# Patient Record
Sex: MALE | Race: ASIAN | NOT HISPANIC OR LATINO | Employment: FULL TIME | URBAN - METROPOLITAN AREA
[De-identification: names, ages, dates, MRNs, and addresses within clinical notes are randomized per-mention and may not be internally consistent; named-entity substitution may affect disease eponyms.]

---

## 2017-04-28 ENCOUNTER — OFFICE VISIT (OUTPATIENT)
Dept: FAMILY MEDICINE | Facility: CLINIC | Age: 47
End: 2017-04-28

## 2017-04-28 VITALS
RESPIRATION RATE: 16 BRPM | TEMPERATURE: 97.9 F | OXYGEN SATURATION: 97 % | HEART RATE: 76 BPM | SYSTOLIC BLOOD PRESSURE: 160 MMHG | WEIGHT: 152.6 LBS | DIASTOLIC BLOOD PRESSURE: 88 MMHG | HEIGHT: 63 IN | BODY MASS INDEX: 27.04 KG/M2

## 2017-04-28 DIAGNOSIS — R80.9 PROTEINURIA: Primary | ICD-10-CM

## 2017-04-28 DIAGNOSIS — R03.0 ELEVATED BLOOD PRESSURE READING WITHOUT DIAGNOSIS OF HYPERTENSION: ICD-10-CM

## 2017-04-28 DIAGNOSIS — K21.9 GASTROESOPHAGEAL REFLUX DISEASE, ESOPHAGITIS PRESENCE NOT SPECIFIED: ICD-10-CM

## 2017-04-28 LAB
BILIRUBIN UR: NEGATIVE
BLOOD UR: ABNORMAL
CLARITY, URINE: CLEAR
COLOR UR: YELLOW
GLUCOSE URINE: NEGATIVE
KETONES UR QL: NEGATIVE
LEUKOCYTE ESTERASE UR: NEGATIVE
NITRITE UR QL STRIP: NEGATIVE
PH UR STRIP: 6 [PH] (ref 5–7)
PROTEIN UR: ABNORMAL
SP GR UR STRIP: >=1.03
UROBILINOGEN UR STRIP-ACNC: ABNORMAL

## 2017-04-28 RX ORDER — AMLODIPINE BESYLATE 5 MG/1
5 TABLET ORAL DAILY
Qty: 90 TABLET | Refills: 1 | Status: SHIPPED | OUTPATIENT
Start: 2017-04-28 | End: 2020-02-20

## 2017-04-28 NOTE — PATIENT INSTRUCTIONS
Thank you for coming in today Mr. Llanos!  Start taking amlodipine 5mg daily for blood pressure.   You can take ranitidine for heartburn/stomach pain.   Please follow up in 2 weeks for blood pressure check and to discuss stomach pain.       Proteinuria (Protein in the Urine)  The kidney filters waste products and unwanted substances from the blood. These waste products end up in the urine. Protein is an important part of the blood and is not filtered out. Normally, there is no protein in the urine.  Proteinuria occurs when some of the normal protein in the bloodstream ends up in the urine. Protein in the urine will show up on a standard urine test.   Protein in the urine can be a sign of serious disease or a harmless temporary condition. For example, heavy exercise or fever can cause temporary proteinuria. This is normal and will go away if the urine test is repeated.  If urine tests continue to show protein in the urine, chronic kidney disease may be present. Common causes of kidney disease include diabetes, hypertension, and conditions that cause inflammation in the kidneys.  Protein in the blood helps keep fluids from leaking out of the blood vessels and into the surrounding tissues. Mild or temporary proteinuria doesn't cause any symptoms. However, if too much protein is lost from the body, there may be swelling as fluid leaks from the blood vessels. Swelling may appear in legs, feet, and ankles or elsewhere such as lower back, face and eyelids.  If proteinuria persists on repeat urine testing, more tests will be needed to figure out the cause. If the cause is still unclear, you may be told to see a kidney specialist.  Home care  No special home care is needed until the cause of your proteinuria is known.  Follow-up care  Follow up with your doctor or as advised by our staff.  When to seek medical care  Get prompt medical attention if any of the following occur:    Nausea or vomiting    Severe weakness, dizziness,  fainting, drowsiness, or confusion    Chest pain or shortness of breath    Unexpected weight gain or swelling in the legs, ankles, or around the eyes    Dark colored urine    Decreased or absent urine output    8739-4427 [employer]. [address] [place] [address], [place] [address]. All rights reserved. This information is not intended as a substitute for professional medical care. Always follow your healthcare professional's instructions.        GERD (Adult)    The esophagus is a tube that carries food from the mouth to the stomach. A valve at the lower end of the esophagus prevents stomach acid from flowing upward. When this valve doesn't work properly, stomach contents may repeatedly flow back up (reflux) into the esophagus. This is called gastroesophageal reflux disease (GERD). GERD can irritate the esophagus. It can cause problems with swallowing or breathing. In severe cases, GERD can cause recurrent pneumonia or other serious problems.  Symptoms of reflux include burning, pressure or sharp pain in the upper abdomen or mid to lower chest. The pain can spread to the neck, back, or shoulder. There may be belching, an acid taste in the back of the throat, chronic cough, or sore throat or hoarseness. GERD symptoms often occur during the day after a big meal. They can also occur at night when lying down.   Home care  Lifestyle changes can help reduce symptoms. If needed, medicines may be prescribed. Symptoms often improve with treatment, but if treatment is stopped, the symptoms often return after a few months. So most persons with GERD will need to continue treatment.  Lifestyle changes    Limit or avoid fatty, fried, and spicy foods, as well as coffee, chocolate, mint, and foods with high acid content such as tomatoes and citrus fruit and juices (orange, grapefruit, lemon).    Don t eat large meals, especially at night. Frequent, smaller meals are best. Do not lie down right after eating. And don t eat  "anything 3 hours before going to bed.    Avoid drinking alcohol and smoking. As much as possible, stay away from second hand smoke.    If you are overweight, losing weight will reduce symptoms.     Avoid wearing tight clothing around your stomach area.    If your symptoms occur during sleep, use a foam wedge to elevate your upper body (not just your head.) Or, place 4\" blocks under the head of your bed.  Medicines  If needed, medicines can help relieve the symptoms of GERD and prevent damage to the esophagus. Discuss a medicine plan with your healthcare provider. This may include one or more of the following medicines:    Antacids to help neutralize the normal acids in your stomach.    Acid blockers (H2 blockers) to decrease acid production.    Acid inhibitors (PPIs) to decrease acid production in a different way than the blockers. They may work better, but can take a little longer to take effect.  Take an antacid 30-60 minutes after eating and at bedtime, but not at the same time as an acid blocker.  Try not to take medicines such as ibuprofen and aspirin. If you are taking aspirin for your heart or other medical reasons, talk to your healthcare provider about stopping it.  Follow-up care  Follow up with your healthcare provider or as advised by our staff.  When to seek medical advice  Call your healthcare provider if any of the following occur:    Stomach pain gets worse or moves to the lower right abdomen (appendix area)    Chest pain appears or gets worse, or spreads to the back, neck, shoulder, or arm    Frequent vomiting (can t keep down liquids)    Blood in the stool or vomit (red or black in color)    Feeling weak or dizzy    Fever of 100.4 F (38 C) or higher, or as directed by your healthcare provider    0755-6205 [employer]. [address] [place] [address], [place] [address]. All rights reserved. This information is not intended as a substitute for professional medical care. Always follow your " healthcare professional's instructions.      Lifestyle Changes for Controlling GERD  When you have GERD, stomach acid feels as if it s backing up toward your mouth. Whether or not you take medication to control your GERD, your symptoms can often be improved with lifestyle changes. Talk to your doctor about the following suggestions, which may help you get relief from your symptoms.  Raise Your Head    Reflux is more likely to strike when you re lying down flat, because stomach fluid can flow backward more easily. Raising the head of your bed 4 to 6 inches can help. To do this:    Slide blocks or books under the legs at the head of your bed. Or, place a wedge under the mattress. Many foam [employer] can make a suitable wedge for you. The wedge should run from your waist to the top of your head.    Don t just prop your head on several pillows. This increases pressure on your stomach. It can make GERD worse.  Watch Your Eating Habits  Certain foods may increase the acid in your stomach or relax the lower esophageal sphincter, making GERD more likely. It s best to avoid the following:    Coffee, tea, and carbonated drinks (with and without caffeine)    Fatty, fried, or spicy food    Mint, chocolate, onions, and tomatoes    Any other foods that seem to irritate your stomach or cause you pain  Relieve the Pressure    Eat smaller meals, even if you have to eat more often.    Don t lie down right after you eat. Wait a few hours for your stomach to empty.    Avoid tight belts and tight-fitting clothes.    Lose excess weight.  Tobacco and Alcohol  Avoid smoking tobacco and drinking alcohol. They can make GERD symptoms worse.    3163-5886 [employer]. [address] [place] [address], [place] [address]. All rights reserved. This information is not intended as a substitute for professional medical care. Always follow your healthcare professional's instructions.

## 2017-04-28 NOTE — MR AVS SNAPSHOT
After Visit Summary   4/28/2017    Zheng Llnaos    MRN: [id number]           Patient Information     Date Of Birth          1970        Visit Information        Provider Department      4/28/2017 2:00 PM Naren Finn MD [facility]        Today's Diagnoses     Proteinuria    -  1    Elevated blood pressure reading without diagnosis of hypertension        Gastroesophageal reflux disease, esophagitis presence not specified          Care Instructions    Thank you for coming in today Mr. Llanos!  Start taking amlodipine 5mg daily for blood pressure.   You can take ranitidine for heartburn/stomach pain.   Please follow up in 2 weeks for blood pressure check and to discuss stomach pain.       Proteinuria (Protein in the Urine)  The kidney filters waste products and unwanted substances from the blood. These waste products end up in the urine. Protein is an important part of the blood and is not filtered out. Normally, there is no protein in the urine.  Proteinuria occurs when some of the normal protein in the bloodstream ends up in the urine. Protein in the urine will show up on a standard urine test.   Protein in the urine can be a sign of serious disease or a harmless temporary condition. For example, heavy exercise or fever can cause temporary proteinuria. This is normal and will go away if the urine test is repeated.  If urine tests continue to show protein in the urine, chronic kidney disease may be present. Common causes of kidney disease include diabetes, hypertension, and conditions that cause inflammation in the kidneys.  Protein in the blood helps keep fluids from leaking out of the blood vessels and into the surrounding tissues. Mild or temporary proteinuria doesn't cause any symptoms. However, if too much protein is lost from the body, there may be swelling as fluid leaks from the blood vessels. Swelling may appear in legs, feet, and ankles or elsewhere such as lower back, face and  eyelids.  If proteinuria persists on repeat urine testing, more tests will be needed to figure out the cause. If the cause is still unclear, you may be told to see a kidney specialist.  Home care  No special home care is needed until the cause of your proteinuria is known.  Follow-up care  Follow up with your doctor or as advised by our staff.  When to seek medical care  Get prompt medical attention if any of the following occur:    Nausea or vomiting    Severe weakness, dizziness, fainting, drowsiness, or confusion    Chest pain or shortness of breath    Unexpected weight gain or swelling in the legs, ankles, or around the eyes    Dark colored urine    Decreased or absent urine output    3106-2406 [employer]. [address] [place] [address], [place] [address]. All rights reserved. This information is not intended as a substitute for professional medical care. Always follow your healthcare professional's instructions.        GERD (Adult)    The esophagus is a tube that carries food from the mouth to the stomach. A valve at the lower end of the esophagus prevents stomach acid from flowing upward. When this valve doesn't work properly, stomach contents may repeatedly flow back up (reflux) into the esophagus. This is called gastroesophageal reflux disease (GERD). GERD can irritate the esophagus. It can cause problems with swallowing or breathing. In severe cases, GERD can cause recurrent pneumonia or other serious problems.  Symptoms of reflux include burning, pressure or sharp pain in the upper abdomen or mid to lower chest. The pain can spread to the neck, back, or shoulder. There may be belching, an acid taste in the back of the throat, chronic cough, or sore throat or hoarseness. GERD symptoms often occur during the day after a big meal. They can also occur at night when lying down.   Home care  Lifestyle changes can help reduce symptoms. If needed, medicines may be prescribed. Symptoms often improve with  "treatment, but if treatment is stopped, the symptoms often return after a few months. So most persons with GERD will need to continue treatment.  Lifestyle changes    Limit or avoid fatty, fried, and spicy foods, as well as coffee, chocolate, mint, and foods with high acid content such as tomatoes and citrus fruit and juices (orange, grapefruit, lemon).    Don t eat large meals, especially at night. Frequent, smaller meals are best. Do not lie down right after eating. And don t eat anything 3 hours before going to bed.    Avoid drinking alcohol and smoking. As much as possible, stay away from second hand smoke.    If you are overweight, losing weight will reduce symptoms.     Avoid wearing tight clothing around your stomach area.    If your symptoms occur during sleep, use a foam wedge to elevate your upper body (not just your head.) Or, place 4\" blocks under the head of your bed.  Medicines  If needed, medicines can help relieve the symptoms of GERD and prevent damage to the esophagus. Discuss a medicine plan with your healthcare provider. This may include one or more of the following medicines:    Antacids to help neutralize the normal acids in your stomach.    Acid blockers (H2 blockers) to decrease acid production.    Acid inhibitors (PPIs) to decrease acid production in a different way than the blockers. They may work better, but can take a little longer to take effect.  Take an antacid 30-60 minutes after eating and at bedtime, but not at the same time as an acid blocker.  Try not to take medicines such as ibuprofen and aspirin. If you are taking aspirin for your heart or other medical reasons, talk to your healthcare provider about stopping it.  Follow-up care  Follow up with your healthcare provider or as advised by our staff.  When to seek medical advice  Call your healthcare provider if any of the following occur:    Stomach pain gets worse or moves to the lower right abdomen (appendix area)    Chest pain " appears or gets worse, or spreads to the back, neck, shoulder, or arm    Frequent vomiting (can t keep down liquids)    Blood in the stool or vomit (red or black in color)    Feeling weak or dizzy    Fever of 100.4 F (38 C) or higher, or as directed by your healthcare provider    2892-1567 [employer]. [address] [place] [address], [place] [address]. All rights reserved. This information is not intended as a substitute for professional medical care. Always follow your healthcare professional's instructions.      Lifestyle Changes for Controlling GERD  When you have GERD, stomach acid feels as if it s backing up toward your mouth. Whether or not you take medication to control your GERD, your symptoms can often be improved with lifestyle changes. Talk to your doctor about the following suggestions, which may help you get relief from your symptoms.  Raise Your Head    Reflux is more likely to strike when you re lying down flat, because stomach fluid can flow backward more easily. Raising the head of your bed 4 to 6 inches can help. To do this:    Slide blocks or books under the legs at the head of your bed. Or, place a wedge under the mattress. Many [employer] can make a suitable wedge for you. The wedge should run from your waist to the top of your head.    Don t just prop your head on several pillows. This increases pressure on your stomach. It can make GERD worse.  Watch Your Eating Habits  Certain foods may increase the acid in your stomach or relax the lower esophageal sphincter, making GERD more likely. It s best to avoid the following:    Coffee, tea, and carbonated drinks (with and without caffeine)    Fatty, fried, or spicy food    Mint, chocolate, onions, and tomatoes    Any other foods that seem to irritate your stomach or cause you pain  Relieve the Pressure    Eat smaller meals, even if you have to eat more often.    Don t lie down right after you eat. Wait a few hours for your stomach to  "empty.    Avoid tight belts and tight-fitting clothes.    Lose excess weight.  Tobacco and Alcohol  Avoid smoking tobacco and drinking alcohol. They can make GERD symptoms worse.    9828-8176 [employer]. [address] [place] [address], [place] [address]. All rights reserved. This information is not intended as a substitute for professional medical care. Always follow your healthcare professional's instructions.                  Follow-ups after your visit        Follow-up notes from your care team     Return in about 2 weeks (around 5/12/2017).      Your next 10 appointments already scheduled     May 16, 2017  2:00 PM CDT   Return Visit with Naren Finn MD   [facility] ([facility])    [address] [place] [address]   [phone]              Who to contact     Please call your clinic at [phone] to:    Ask questions about your health    Make or cancel appointments    Discuss your medicines    Learn about your test results    Speak to your doctor   If you have compliments or concerns about an experience at your clinic, or if you wish to file a complaint, please contact [facility] Patient Relations at [phone] or email us at [email or web address]         Additional Information About Your Visit        Care EveryWhere ID     This is your Care EveryWhere ID. This could be used by other organizations to access your [place] medical records  [id number]        Your Vitals Were     Pulse Temperature Respirations Height Pulse Oximetry BMI (Body Mass Index)    76 97.9  F (36.6  C) (Tympanic) 16 5' 2.75\" (159.4 cm) 97% 27.25 kg/m2       Blood Pressure from Last 3 Encounters:   04/28/17 160/88    Weight from Last 3 Encounters:   04/28/17 152 lb 9.6 oz (69.2 kg)              We Performed the Following     Urinalysis, Micro If ([facility])          Today's Medication Changes          These changes are accurate as of: 4/28/17  3:05 PM.  If you have " any questions, ask your nurse or doctor.               Start taking these medicines.        Dose/Directions    amLODIPine 5 MG tablet   Commonly known as:  NORVASC   Used for:  Elevated blood pressure reading without diagnosis of hypertension   Started by:  Naren Finn MD        Dose:  5 mg   Take 1 tablet (5 mg) by mouth daily   Quantity:  90 tablet   Refills:  1       ranitidine 150 MG tablet   Commonly known as:  ZANTAC   Used for:  Gastroesophageal reflux disease, esophagitis presence not specified   Started by:  Naren Finn MD        Dose:  150 mg   Take 1 tablet (150 mg) by mouth 2 times daily as needed for heartburn   Quantity:  60 tablet   Refills:  1            Where to get your medicines      These medications were sent to [facility] - [place], [place] - [address] [place] [address] Pkw[address]  [address], [place] [address]     Phone:  [phone]     amLODIPine 5 MG tablet    ranitidine 150 MG tablet                Primary Care Provider Office Phone # Fax #    Naren Finn [phone]       [facility] [address] [place] [address]        Thank you!     Thank you for choosing [facility]  for your care. Our goal is always to provide you with excellent care. Hearing back from our patients is one way we can continue to improve our services. Please take a few minutes to complete the written survey that you may receive in the mail after your visit with us. Thank you!             Your Updated Medication List - Protect others around you: Learn how to safely use, store and throw away your medicines at www.disposemymeds.org.          This list is accurate as of: 4/28/17  3:05 PM.  Always use your most recent med list.                   Brand Name Dispense Instructions for use    amLODIPine 5 MG tablet    NORVASC    90 tablet    Take 1 tablet (5 mg) by mouth daily       ranitidine 150 MG tablet    ZANTAC    60 tablet    Take 1 tablet (150 mg) by mouth  2 times daily as needed for heartburn

## 2017-04-28 NOTE — PROGRESS NOTES
Preceptor Attestation:  Patient's case reviewed and discussed with Naren Finn MD resident and I evaluated the patient. I agree with written assessment and plan of care.  Supervising Physician:Jason Casas MD    [facility]

## 2017-04-28 NOTE — PROGRESS NOTES
"       HPI:   Zheng Llanos is a previously healthy 46 year old who presents to [facility] care. His concerns include stomach pain, and proteinuria noted on recent health screen.    History:  Healthy, not on meds  Never been hospitalized  Moved from [facility] [place] [facility] in 1988 to [place], Moved to [place] in 1991   Works nightshift in packaging  Never been to a clinic  Exercise: weight lifting- plays soccer  Has 10 kids and 11 grandkids    Stomach pain:  After eating anything sweet or spicy  Started 1 year ago, getting worse for the past 2 weeks  Not taking any medicines  Onset 30 minutes to 1 hour after eating  No vomiting, no nausea  Regular bowel movements, no blood or black stools  Feeling strong, not weak tired or sleepy   Never tested for H pylori    Daughter Emir reports she is concerned because father had proteinuria on life insurance screening recently.     A [employer]  was used for this visit         PMHX:     Patient Active Problem List   Diagnosis     Elevated blood pressure reading without diagnosis of hypertension     Proteinuria     Gastroesophageal reflux disease, esophagitis presence not specified       No current outpatient prescriptions on file.       Social History   Substance Use Topics     Smoking status: Never Smoker     Smokeless tobacco: Not on file     Alcohol use No       Social History     Social History Narrative     No narrative on file       No Known Allergies    No results found for this or any previous visit (from the past 24 hour(s)).         Review of Systems:   Complete Review of Systems negative except as above.          Physical Exam:     Vitals:    04/28/17 1402 04/28/17 1407   BP: (!) 157/101 160/88   Pulse: 76    Resp: 16    Temp: 97.9  F (36.6  C)    TempSrc: Tympanic    SpO2: 97%    Weight: 152 lb 9.6 oz (69.2 kg)    Height: 5' 2.75\" (159.4 cm)      Body mass index is 27.25 kg/(m^2).    General: Alert, well-appearing male in NAD  HEENT: PERRL, moist oral mucus membranes  Pulm: CTA BL, no " tachypnea  CV: RRR, no murmur  Abd: soft, +epigastric tenderness, no guarding, no rebound, nondistended  Ext: Warm, well perfused, no LE edema  Skin: No rash on limited skin exam  Psych: Mood appropriate to visit content, full affect, rational thought content and process      Assessment and Plan   1. Proteinuria- noted on health screening. Will repeat today, and if still present will have patient collect urine at home waking up (recumbent) to bring to follow up in 2 weeks. Suspect orthostatic proteinuria vs secondary to hypertension. Plan for repeat UA and BMP at follow up.   - Urinalysis, Micro If ([facility])      2. Elevated blood pressure reading without diagnosis of hypertension  Two elevated readings today, in the setting of proteinuria will initiate treatment.  - amLODIPine (NORVASC) 5 MG tablet; Take 1 tablet (5 mg) by mouth daily  Dispense: 90 tablet; Refill: 1      3. Gastroesophageal reflux disease, esophagitis presence not specified  - ranitidine (ZANTAC) 150 MG tablet; Take 1 tablet (150 mg) by mouth 2 times daily as needed for heartburn  Dispense: 60 tablet; Refill: 1    Follow up in 2 weeks    Options for treatment and follow-up care were reviewed with the patient and/or guardian. Aleksandraa Romi and/or guardian engaged in the decision making process and verbalized understanding of the options discussed and agreed with the final plan.    Naren Finn MD  [facility], PGY1    Ok to call  Emir daughter with results      Precepted with: Dr. Casas

## 2017-04-28 NOTE — NURSING NOTE
name: Win Sanford  Language: Callieong  Agency: [facility]  Phone number: [phone]

## 2017-05-16 ENCOUNTER — RECORDS - HEALTHEAST (OUTPATIENT)
Dept: ADMINISTRATIVE | Facility: OTHER | Age: 47
End: 2017-05-16

## 2017-05-16 ENCOUNTER — OFFICE VISIT (OUTPATIENT)
Dept: FAMILY MEDICINE | Facility: CLINIC | Age: 47
End: 2017-05-16

## 2017-05-16 VITALS
DIASTOLIC BLOOD PRESSURE: 84 MMHG | SYSTOLIC BLOOD PRESSURE: 126 MMHG | TEMPERATURE: 97.6 F | HEART RATE: 74 BPM | HEIGHT: 63 IN | RESPIRATION RATE: 18 BRPM | WEIGHT: 153.6 LBS | OXYGEN SATURATION: 97 % | BODY MASS INDEX: 27.21 KG/M2

## 2017-05-16 DIAGNOSIS — R03.0 ELEVATED BLOOD PRESSURE READING WITHOUT DIAGNOSIS OF HYPERTENSION: Primary | ICD-10-CM

## 2017-05-16 DIAGNOSIS — R80.9 PROTEINURIA: ICD-10-CM

## 2017-05-16 LAB
BACTERIA: NORMAL
BILIRUBIN UR: NEGATIVE
BILIRUBIN UR: NEGATIVE
BLOOD UR: ABNORMAL
BLOOD UR: ABNORMAL
BUN SERPL-MCNC: 14 MG/DL (ref 5–24)
CALCIUM SERPL-MCNC: 9.3 MG/DL (ref 8.5–10.4)
CASTS: NORMAL /LPF
CHLORIDE SERPLBLD-SCNC: 103 MMOL/L (ref 94–109)
CLARITY, URINE: CLEAR
CLARITY, URINE: CLEAR
CO2 SERPL-SCNC: 25 MMOL/L (ref 20–32)
COLOR UR: YELLOW
COLOR UR: YELLOW
CREAT SERPL-MCNC: 1 MG/DL (ref 0.8–1.5)
CRYSTAL URINE: NORMAL /LPF
EGFR CALCULATED (BLACK REFERENCE): >90 ML/MIN
EGFR CALCULATED (NON BLACK REFERENCE): 85.1 ML/MIN
EPITHELIAL CELLS UR: <2 /LPF (ref 0–2)
GLUCOSE SERPL-MCNC: 109 MG/DL (ref 60–109)
GLUCOSE URINE: NEGATIVE
GLUCOSE URINE: NEGATIVE
KETONES UR QL: NEGATIVE
KETONES UR QL: NEGATIVE
LEUKOCYTE ESTERASE UR: NEGATIVE
LEUKOCYTE ESTERASE UR: NEGATIVE
MUCOUS URINE: NORMAL LPF
NITRITE UR QL STRIP: NEGATIVE
NITRITE UR QL STRIP: NEGATIVE
PH UR STRIP: 5.5 [PH] (ref 5–7)
PH UR STRIP: 6 [PH] (ref 5–7)
POTASSIUM SERPL-SCNC: 3.7 MMOL/L (ref 3.4–5.3)
PROTEIN UR: ABNORMAL
PROTEIN UR: ABNORMAL
RBC URINE: <2 /HPF
SODIUM SERPL-SCNC: 141 MMOL/L (ref 133–144)
SP GR UR STRIP: 1.02
SP GR UR STRIP: >=1.03
UROBILINOGEN UR STRIP-ACNC: ABNORMAL
UROBILINOGEN UR STRIP-ACNC: ABNORMAL
WBC URINE: <2 /HPF

## 2017-05-16 NOTE — NURSING NOTE
5/16 PVP  BMP on arrival, UA (patient should be bringing urine sample in)  Thanks  LL    5/16/2017 PCS Previsit Plan   DUE FOR:  Tdap  Lipid?  ANGELIQUE for previous clinic?  JUAN PABLO Johnson    Ok to call Emir (daughter) [phone] with results per patient     name: Jayden Eaton  Language: ong  Agency: [facility]  Phone number: [phone]

## 2017-05-16 NOTE — PROGRESS NOTES
"       HPI:   Zheng Llanos is a 47 year old  male with medical history of GERD who presents to follow up hypertension and stomach pain.     Stomach pain:  -medication helps, pain is getting better, with the medication has not had any pain    Hypertension:  -has been taking amlodipine, no side effects. No dizziness, no swelling of legs.     Proteinuria:   Brought in a urine sample today, collected after waking up.  No family history of renal disease.   Denies history of nephrolithiasis (though it is noted in chart).  No dialysis in the family.   No NSAIDs or pain killers.   No exposures to toxin or chemicals.   No history of drug use.     A Foap AB  was used for this visit         PMHX:     Patient Active Problem List   Diagnosis     Elevated blood pressure reading without diagnosis of hypertension     Proteinuria     Gastroesophageal reflux disease, esophagitis presence not specified       Current Outpatient Prescriptions   Medication Sig Dispense Refill     amLODIPine (NORVASC) 5 MG tablet Take 1 tablet (5 mg) by mouth daily 90 tablet 1     ranitidine (ZANTAC) 150 MG tablet Take 1 tablet (150 mg) by mouth 2 times daily as needed for heartburn 60 tablet 1       Social History   Substance Use Topics     Smoking status: Never Smoker     Smokeless tobacco: Not on file     Alcohol use No       Social History     Social History Narrative       No Known Allergies    No results found for this or any previous visit (from the past 24 hour(s)).         Review of Systems:   Complete Review of Systems negative except as above.          Physical Exam:     Vitals:    05/16/17 1402   BP: 126/84   Pulse: 74   Resp: 18   Temp: 97.6  F (36.4  C)   TempSrc: Oral   SpO2: 97%   Weight: 153 lb 9.6 oz (69.7 kg)   Height: 5' 2.5\" (158.8 cm)     Body mass index is 27.65 kg/(m^2).    General: Alert, well-appearing male in NAD  HEENT: PERRL, moist oral mucus membranes  Pulm: CTA BL, no tachypnea  CV: RRR, no murmur  Abd: soft, NTND, no " masses  Ext: Warm, well perfused, no LE edema  Skin: No rash on limited skin exam  Psych: Mood appropriate to visit content, full affect, rational thought content and process      Assessment and Plan   1. Elevated blood pressure reading without diagnosis of hypertension- controlled today on amlodipine. Patient denies side effects. Discussed the option of switching to lisinopril in light of proteinuria. Patient reports he would like to continue with amlodipine for now.   -continue amlodipine    2. Proteinuria- Persistent proteinuria even with recumbent sample from home and controlled blood pressure today. No family history of renal disease or dialysis. Patient denies NSAID use and nephrolithiasis.  At this point will send for Renal US and nephrology referral.   - Basic Metabolic Panel ([facility]) - Results < 1 hr  - Urinalysis, Micro If ([facility]) (recumbent sample from home)  - Urinalysis, Micro If ([facility]) (sample from today)  - Urine Microscopic ([facility])  - NEPHROLOGY ADULT REFERRAL  - US KIDNEY    Options for treatment and follow-up care were reviewed with the patient and/or guardian. Zheng Llanos and/or guardian engaged in the decision making process and verbalized understanding of the options discussed and agreed with the final plan.    Naren Finn MD  [facility] Medicine, PGY1      Precepted with:Brett Peterson MD    Preceptor Attestation:  I saw and evaluated the patient.  I reviewed the resident physician's history, exam, and treatment plan; and I agree with the documentation by the resident physician.  Supervising Physician:  Brett Peterson MD

## 2017-05-16 NOTE — MR AVS SNAPSHOT
"              After Visit Summary   5/16/2017    Zheng Llanos    MRN: [id number]           Patient Information     Date Of Birth          1970        Visit Information        Provider Department      5/16/2017 2:00 PM Naren Finn MD [facility]        Today's Diagnoses     Elevated blood pressure reading without diagnosis of hypertension    -  1    Proteinuria           Follow-ups after your visit        Additional Services     NEPHROLOGY ADULT REFERRAL       Patient prefers to be called    Reason for Referral: Proteinuria     needed: Yes  Language: Hmong    May leave message on voicemail: Yes  [phone] Klever Llanos (Son)      ([facility] Only) Referral should be tracked (Yes/No)? No                  Follow-up notes from your care team     Return in about 4 weeks (around 6/13/2017).      Who to contact     Please call your clinic at [phone] to:    Ask questions about your health    Make or cancel appointments    Discuss your medicines    Learn about your test results    Speak to your doctor   If you have compliments or concerns about an experience at your clinic, or if you wish to file a complaint, please contact [facility] Patient Relations at [phone] or email us at [email or web address]         Additional Information About Your Visit        Care EveryWhere ID     This is your Care EveryWhere ID. This could be used by other organizations to access your [facility] medical records  [id number]        Your Vitals Were     Pulse Temperature Respirations Height Pulse Oximetry BMI (Body Mass Index)    74 97.6  F (36.4  C) (Oral) 18 5' 2.5\" (158.8 cm) 97% 27.65 kg/m2       Blood Pressure from Last 3 Encounters:   05/16/17 126/84   04/28/17 160/88    Weight from Last 3 Encounters:   05/16/17 153 lb 9.6 oz (69.7 kg)   04/28/17 152 lb 9.6 oz (69.2 kg)              We Performed the Following     Basic Metabolic Panel ([facility]) - Results < 1 hr     NEPHROLOGY " ADULT REFERRAL     Urinalysis, Micro If ([facility])     Urinalysis, Micro If ([facility])     Urine Microscopic ([facility])     US KIDNEY        Primary Care Provider Office Phone # Fax #    Naren Finn [phone]       [facility] [address] [place] [address]        Thank you!     Thank you for choosing [facility]  for your care. Our goal is always to provide you with excellent care. Hearing back from our patients is one way we can continue to improve our services. Please take a few minutes to complete the written survey that you may receive in the mail after your visit with us. Thank you!             Your Updated Medication List - Protect others around you: Learn how to safely use, store and throw away your medicines at www.disposemymeds.org.          This list is accurate as of: 5/16/17 11:59 PM.  Always use your most recent med list.                   Brand Name Dispense Instructions for use    amLODIPine 5 MG tablet    NORVASC    90 tablet    Take 1 tablet (5 mg) by mouth daily       ranitidine 150 MG tablet    ZANTAC    60 tablet    Take 1 tablet (150 mg) by mouth 2 times daily as needed for heartburn

## 2017-05-25 NOTE — PATIENT INSTRUCTIONS
Referral for (Test): NEPHROLOGY   Location/Place/Provider: [facility], [address] [place] [address], [address], [place] [address]  Date/Time: 06/19/17 at 2:00pm ()   Phone: [phone]  Fax:   Additional information/prep.:   Scheduled by: LESA Valero    Referral for (Test): Ultrasound of Kidney  Location/Place/Provider: [facility]   Date/Time: 06/08/17 at 2:00pm     Phone:[phone]   Fax:[phone]   Additional information/prep.:   Scheduled by: LESA Valero

## 2017-06-08 ENCOUNTER — HOSPITAL ENCOUNTER (OUTPATIENT)
Dept: ULTRASOUND IMAGING | Facility: HOSPITAL | Age: 47
Discharge: HOME | End: 2017-06-08

## 2017-06-08 DIAGNOSIS — R80.9 PROTEINURIA: ICD-10-CM

## 2020-02-19 ENCOUNTER — OFFICE VISIT (OUTPATIENT)
Dept: FAMILY MEDICINE | Facility: CLINIC | Age: 50
End: 2020-02-19
Payer: COMMERCIAL

## 2020-02-19 ENCOUNTER — TELEPHONE (OUTPATIENT)
Dept: FAMILY MEDICINE | Facility: CLINIC | Age: 50
End: 2020-02-19

## 2020-02-19 VITALS
TEMPERATURE: 97.4 F | BODY MASS INDEX: 25.34 KG/M2 | OXYGEN SATURATION: 96 % | RESPIRATION RATE: 16 BRPM | HEIGHT: 63 IN | DIASTOLIC BLOOD PRESSURE: 76 MMHG | HEART RATE: 67 BPM | SYSTOLIC BLOOD PRESSURE: 115 MMHG | WEIGHT: 143 LBS

## 2020-02-19 DIAGNOSIS — Z00.00 ROUTINE HISTORY AND PHYSICAL EXAMINATION OF ADULT: ICD-10-CM

## 2020-02-19 DIAGNOSIS — Z23 NEED FOR PROPHYLACTIC VACCINATION AND INOCULATION AGAINST INFLUENZA: Primary | ICD-10-CM

## 2020-02-19 LAB
ALBUMIN SERPL BCP-MCNC: 3.2 G/DL (ref 3.5–5)
ALP SERPL-CCNC: 110 U/L (ref 45–120)
ALT SERPL W/O P-5'-P-CCNC: 44 U/L (ref 0–45)
ANION GAP SERPL CALCULATED.3IONS-SCNC: 11 MMOL/L (ref 5–18)
AST SERPL-CCNC: 28 U/L (ref 0–40)
BACTERIA: NORMAL
BILIRUB SERPL-MCNC: 0.4 MG/DL (ref 0–1)
BILIRUBIN UR: NEGATIVE MG/DL
BLOOD UR: ABNORMAL MG/DL
BUN SERPL-MCNC: 26 MG/DL (ref 8–22)
CALCIUM SERPL-MCNC: 9.2 MG/DL (ref 8.5–10.5)
CASTS: NORMAL /LPF
CHLORIDE SERPL-SCNC: 100 MMOL/L (ref 98–107)
CLARITY, URINE: CLEAR
CO2 SERPL-SCNC: 22 MMOL/L (ref 22–31)
COLOR UR: YELLOW
CREAT SERPL-MCNC: 2.04 MG/DL (ref 0.7–1.3)
CRYSTAL URINE: NORMAL /LPF
EPITHELIAL CELLS UR: <2 /LPF (ref 0–2)
GLUCOSE SERPL-MCNC: 574 MG/DL (ref 70–125)
GLUCOSE URINE: ABNORMAL
HCT VFR BLD AUTO: 42.5 % (ref 40–53)
HEMOGLOBIN: 14 G/DL (ref 13.3–17.7)
KETONES UR QL: NEGATIVE MG/DL
LEUKOCYTE ESTERASE UR: NEGATIVE
MCH RBC QN AUTO: 30.3 PG (ref 26.5–35)
MCHC RBC AUTO-ENTMCNC: 32.9 G/DL (ref 32–36)
MCV RBC AUTO: 92 FL (ref 78–100)
MUCOUS URINE: NORMAL LPF
NITRITE UR QL STRIP: NEGATIVE MG/DL
PH UR STRIP: 5 [PH] (ref 4.5–8)
PLATELET # BLD AUTO: 190 K/UL (ref 150–450)
POTASSIUM SERPL-SCNC: 4.1 MMOL/L (ref 3.5–5)
PROT SERPL-MCNC: 7.5 G/DL (ref 6–8)
PROTEIN UR: NEGATIVE MG/DL
RBC # BLD AUTO: 4.62 M/UL (ref 4.4–5.9)
RBC URINE: <2 /HPF
SODIUM SERPL-SCNC: 133 MMOL/L (ref 136–145)
SP GR UR STRIP: 1.01 (ref 1–1.03)
UROBILINOGEN UR STRIP-ACNC: ABNORMAL E.U./DL
WBC # BLD AUTO: 9.9 K/UL (ref 4–11)
WBC URINE: NORMAL /HPF

## 2020-02-19 ASSESSMENT — MIFFLIN-ST. JEOR: SCORE: 1408.77

## 2020-02-19 NOTE — PROGRESS NOTES
Assessment and Plan     DM2: Ordered CMP due to muscle cramps and poor healthcare follow up the past couple years. BG >500. No previous hx of this. Displayed no s/s or e/o hyperglycemia. Patient had left clinic by time of result. Patient notified and to return to clinic tomorrow (2/20/20).     Healthcare Maintenance:   - Flu shot    Muscle Cramps:   - CBC  - CMP    Hx Proteinuria and Hydronephrosis:  - UA      Options for treatment and follow-up care were reviewed with the patient and/or guardian. Zheng Llanos and/or guardian engaged in the decision making process and verbalized understanding of the options discussed and agreed with the final plan.    Sascha Luis DO, MBA  [facility], PGY-1    Precepted patient with Dr. Odessa Burnett       HPI:   Zheng Llanos is a 49 year old male who presents to clinic today for   Chief Complaint   Patient presents with     Physical     cpe. want to test for everything     Medication Reconciliation     complete     Physical and lab work. Kidney/liver function tests. No hx or family of significant disease. Pt does have hx of hydronephrosis and proteinuria 2 years ago.     Muscle aches for years. Worse recently. Goes through knees/shoulder at bedtime. Massage makes better. Nothing worsens. Lifting/walking/carying make pain worse. Pain once he starts relaxing. 5-6yrs old he was crossing a river in [facility]. Drinks 2 16oz water bottles per day minimum.     Stomach pain. Anytime but worse when hungry. Last time 2 days ago, burpy. Zantac doesn't help.     Poor vision 2 years. Has glasses. Sees eye doctor for this.     Denies CP, SOB, changes in vision/hearing/GI/, numbness/tingling, or any other concerns.     A [employer]Il[employer]spekt  was used for this visit         Review of Systems:     A complete 12 point ROS was negative unless otherwise noted in HPI.          PMHX:   Active Problems List  Patient Active Problem List  "  Diagnosis     Elevated blood pressure reading without diagnosis of hypertension     Proteinuria     Gastroesophageal reflux disease, esophagitis presence not specified     Active problem list reviewed and updated.    Current Medications  Current Outpatient Medications   Medication Sig Dispense Refill     amLODIPine (NORVASC) 5 MG tablet Take 1 tablet (5 mg) by mouth daily (Patient not taking: Reported on 2/19/2020) 90 tablet 1     ranitidine (ZANTAC) 150 MG tablet Take 1 tablet (150 mg) by mouth 2 times daily as needed for heartburn (Patient not taking: Reported on 2/19/2020) 60 tablet 1     Medication list reviewed and updated.    Social History  Social History     Tobacco Use     Smoking status: Never Smoker     Smokeless tobacco: Never Used   Substance Use Topics     Alcohol use: No     Drug use: No     History   Drug Use No     Social history reviewed and updated.    Family History  No family history on file.  Family history reviewed and updated.    Allergies  No Known Allergies  Allergies and medication intolerances reviewed and updated.         Physical Exam:     Vitals:    02/19/20 1442   BP: 115/76   Pulse: 67   Resp: 16   Temp: 97.4  F (36.3  C)   TempSrc: Oral   SpO2: 96%   Weight: 64.9 kg (143 lb)   Height: 1.6 m (5' 3\")     Body mass index is 25.33 kg/m .    GENERAL APPEARANCE: alert, appears stated age, no acute distress  HEENT: Eyes grossly normal to inspection, nares normal, and mouth and throat without erythema, ulcers, or lesions  RESP: lungs clear to auscultation - no rales, rhonchi, or wheezes  CV: regular rate and rhythm, no murmur, click, rub, or gallop  ABDOMEN: soft, nontender   MSK: extremities normal, no gross deformities noted, no lower extremity edema  SKIN: no suspicious lesions or rashes   NEURO: Normal strength and tone, sensory exam grossly normal, mentation appears intact and speech normal  PSYCH: mood and affect normal/bright     "

## 2020-02-19 NOTE — NURSING NOTE
Due to patient being non-English speaking/uses sign language, an  was used for this visit. Only for face-to-face interpretation by an external agency, date and length of interpretation can be found on the scanned worksheet.     name: kelsy  Agency: [employer]  Language: Rimma   Telephone number: [phone]  Type of interpretation: Face-to-face, spoken

## 2020-02-20 ENCOUNTER — OFFICE VISIT (OUTPATIENT)
Dept: FAMILY MEDICINE | Facility: CLINIC | Age: 50
End: 2020-02-20
Payer: COMMERCIAL

## 2020-02-20 VITALS
WEIGHT: 142.6 LBS | RESPIRATION RATE: 20 BRPM | OXYGEN SATURATION: 98 % | HEIGHT: 63 IN | HEART RATE: 77 BPM | DIASTOLIC BLOOD PRESSURE: 79 MMHG | TEMPERATURE: 97.2 F | SYSTOLIC BLOOD PRESSURE: 127 MMHG | BODY MASS INDEX: 25.27 KG/M2

## 2020-02-20 DIAGNOSIS — E11.9 TYPE 2 DIABETES MELLITUS WITHOUT COMPLICATION, WITHOUT LONG-TERM CURRENT USE OF INSULIN (H): Primary | ICD-10-CM

## 2020-02-20 LAB
GLUCOSE CASUAL: 374 MG/DL (ref 51–200)
HBA1C MFR BLD: 13.5 % (ref 4.1–5.7)

## 2020-02-20 ASSESSMENT — MIFFLIN-ST. JEOR: SCORE: 1406.96

## 2020-02-20 NOTE — PROGRESS NOTES
Assessment and Plan     DM2: Newly dx. Had BG >550 . Repeat  today. A1C 13.5. Glucosuria. No s/s of hyperglycemia.  - Start Metformin 1000mg BID  - Consider starting Insulin next visit  - DM education and glucometer training  - Follow up 1 week.      Options for treatment and follow-up care were reviewed with the patient and/or guardian. Zheng Llanos and/or guardian engaged in the decision making process and verbalized understanding of the options discussed and agreed with the final plan.    Sascha Luis DO, MBA  [facility], PGY-1    Precepted patient with Dr. Lyndon Gallardo       HPI:   Zheng Llanos is a 49 year old male who presents to clinic today for   Chief Complaint   Patient presents with     RECHECK     Blood sugar     Had elevated BG on [facility] yesterday. No s/s of hyperglycemia. Denies CP, SOB, changes in vision/hearing/GI/, abdominal pain, or any other concerns.    A [employer]  was used for this visit         Review of Systems:     A complete 12 point ROS was negative unless otherwise noted in HPI.          PMHX:   Active Problems List  Patient Active Problem List   Diagnosis     Elevated blood pressure reading without diagnosis of hypertension     Proteinuria     Gastroesophageal reflux disease, esophagitis presence not specified     Active problem list reviewed and updated.    Current Medications  No current outpatient medications on file.     Medication list reviewed and updated.    Social History  Social History     Tobacco Use     Smoking status: Never Smoker     Smokeless tobacco: Never Used   Substance Use Topics     Alcohol use: No     Drug use: No     History   Drug Use No     Social history reviewed and updated.    Family History  No family history on file.  Family history reviewed and updated.    Allergies  Allergies   Allergen Reactions     No Known Allergies      Allergies and medication intolerances reviewed and  "updated.         Physical Exam:     Vitals:    02/20/20 1601   BP: 127/79   Pulse: 77   Resp: 20   Temp: 97.2  F (36.2  C)   SpO2: 98%   Weight: 64.7 kg (142 lb 9.6 oz)   Height: 1.6 m (5' 3\")     Body mass index is 25.26 kg/m .    GENERAL APPEARANCE: alert, appears stated age, no acute distress  HEENT: Eyes grossly normal to inspection, nares normal, and mouth and throat without erythema, ulcers, or lesions  RESP: lungs clear to auscultation - no rales, rhonchi, or wheezes  CV: regular rate and rhythm, no murmur, click, rub, or gallop  ABDOMEN: soft, nontender   MSK: extremities normal, no gross deformities noted, no lower extremity edema  PSYCH: mood and affect normal/bright     "

## 2020-02-20 NOTE — NURSING NOTE
Due to patient being non-English speaking/uses sign language, an  was used for this visit. Only for face-to-face interpretation by an external agency, date and length of interpretation can be found on the scanned worksheet.     name: Harjeet Currie  Agency: [employer]  Language: Rimma   Telephone number: [phone]  Type of interpretation: Face-to-face, spoken

## 2020-02-20 NOTE — PROGRESS NOTES
Preceptor Attestation:   Patient seen, evaluated and discussed with the resident. I have verified the content of the note, which accurately reflects my assessment of the patient and the plan of care.  Supervising Physician:Lyndon Gallardo MD  [facility]

## 2020-02-20 NOTE — TELEPHONE ENCOUNTER
I was notified by [facility] lab about critical lab results for this patient from a clinic visit earlier today. On a CMP, patient's glucose was noted to be 574. I was able to discuss results with patient via telephone with the use of a language line . Discussed results with patient. He states that he had eaten a meal right before coming to clinic, so this result was likely acutely high because of that, but nonetheless, this is a very high glucose. He has no increase in his anion gap, which is reassuring that he is not in DKA. Patient states that he is feeling well at this point in time. I walked through signs/symptoms of DKA, and when to come into the emergency department. I am reassured that he has likely been at this level of hyperglycemia for some time now given his decreased renal function over the course of a ~2.5 year span. I discussed with patient that he should call to clinic tomorrow to make a clinic appointment, and he will do this. I will also route to clinic staff to call patient in the morning for a same day appointment.     I also discussed worsened renal function with patient. It should be noted that his albumin is low, and while total protein isn't elevated, there is a gamma gap present. Could consider MM workup.     Petros Langston MD  [facility] Resident, PGY2

## 2020-02-25 NOTE — PROGRESS NOTES
Preceptor Attestation:  Patient's case reviewed and discussed with Mateus Luis MD resident and I evaluated the patient. I agree with written assessment and plan of care.  Supervising Physician:  RANDY CRANDALL MD  [facility]

## 2020-02-27 ENCOUNTER — OFFICE VISIT (OUTPATIENT)
Dept: FAMILY MEDICINE | Facility: CLINIC | Age: 50
End: 2020-02-27
Payer: COMMERCIAL

## 2020-02-27 VITALS
TEMPERATURE: 97.3 F | OXYGEN SATURATION: 98 % | HEIGHT: 63 IN | HEART RATE: 61 BPM | SYSTOLIC BLOOD PRESSURE: 114 MMHG | WEIGHT: 143.2 LBS | RESPIRATION RATE: 16 BRPM | BODY MASS INDEX: 25.37 KG/M2 | DIASTOLIC BLOOD PRESSURE: 76 MMHG

## 2020-02-27 DIAGNOSIS — E11.9 TYPE 2 DIABETES MELLITUS WITHOUT COMPLICATION, WITHOUT LONG-TERM CURRENT USE OF INSULIN (H): Primary | ICD-10-CM

## 2020-02-27 LAB — GLUCOSE CASUAL: 223 MG/DL (ref 51–200)

## 2020-02-27 ASSESSMENT — MIFFLIN-ST. JEOR: SCORE: 1412.68

## 2020-02-27 NOTE — NURSING NOTE
Due to patient being non-English speaking/uses sign language, an  was used for this visit. Only for face-to-face interpretation by an external agency, date and length of interpretation can be found on the scanned worksheet.     name: Mert Garcia  Agency: [employer]  Language: Rimma   Telephone number: [phone]  Type of interpretation: Face-to-face, spoken

## 2020-02-27 NOTE — PROGRESS NOTES
Assessment and Plan     DM2: New dx 1 week ago.  today. On review of home BG checks, much improved with average 150-250s. Had BG >550 on initial visit without any s/s of hyperglycemia.  A1C 13.5. Glucosuria. No current s/s of hyperglycemia.   - Continue Metformin 1000mg BID  - Continued to recommend better diet and more exercise.  - Ophthalmology referral   - Follow up 1 month      Options for treatment and follow-up care were reviewed with the patient and/or guardian. Zheng Llanos and/or guardian engaged in the decision making process and verbalized understanding of the options discussed and agreed with the final plan.    Sascha Luis DO, NINA  [facility], PGY-1    Precepted patient with Dr. Lyndon Gallardo       HPI:   Zheng Llanos is a 49 year old male who presents to clinic today for   Chief Complaint   Patient presents with     Diabetes     follow-up     Medication Reconciliation     completed     Pt states he's been taking his Metformin BID, no issues or concerns with it. Also, no problems checking his BG with the meter. He states he feels better overall. Denies CP, SOB, numbness/tingling, changes in vision/hearing/diet/GI/ or any other concerns.     A GAMINSIDE  was used for this visit         Review of Systems:     A complete 12 point ROS was negative unless otherwise noted in HPI.          PMHX:   Active Problems List  Patient Active Problem List   Diagnosis     Elevated blood pressure reading without diagnosis of hypertension     Proteinuria     Gastroesophageal reflux disease, esophagitis presence not specified     Type 2 diabetes mellitus, without long-term current use of insulin (H)     Active problem list reviewed and updated.    Current Medications  Current Outpatient Medications   Medication Sig Dispense Refill     blood glucose monitoring XX meter device kit Use to test blood sugar 1-2 times daily or as directed. 1 kit  "1     blood glucose VI test strip Use to test blood sugar 1-2 times daily or as directed. 100 strip 11     blood glucose XX lancets standard Use to test blood sugar 1-2 times daily or as directed. 100 each 11     [START ON 3/2/2020] metFORMIN (GLUCOPHAGE) 1000 MG tablet Take 1 tablet (1,000 mg) by mouth 2 times daily (with meals) 60 tablet 11     metFORMIN 1000 MG PO tablet Take 1 tablet (1,000 mg) by mouth 2 times daily (with meals) 60 tablet 1     Medication list reviewed and updated.    Social History  Social History     Tobacco Use     Smoking status: Never Smoker     Smokeless tobacco: Never Used   Substance Use Topics     Alcohol use: No     Drug use: No     History   Drug Use No     Social history reviewed and updated.    Family History  History reviewed. No pertinent family history.  Family history reviewed and updated.    Allergies  Allergies   Allergen Reactions     No Known Allergies      Allergies and medication intolerances reviewed and updated.         Physical Exam:     Vitals:    02/27/20 1603   BP: 114/76   Pulse: 61   Resp: 16   Temp: 97.3  F (36.3  C)   TempSrc: Oral   SpO2: 98%   Weight: 65 kg (143 lb 3.2 oz)   Height: 1.605 m (5' 3.19\")     Body mass index is 25.22 kg/m .    GENERAL APPEARANCE: alert, appears stated age, no acute distress  HEENT: Eyes grossly normal to inspection, nares normal, and mouth and throat without erythema, ulcers, or lesions  RESP: lungs clear to auscultation - no rales, rhonchi, or wheezes  CV: regular rate and rhythm, no murmur, click, rub, or gallop  ABDOMEN: soft, nontender   MSK: extremities normal, no gross deformities noted, no lower extremity edema  PSYCH: mood and affect normal/bright             "

## 2020-02-28 NOTE — PATIENT INSTRUCTIONS
Referral for :     Ophthalmology    LOCATION/PLACE/Provider :    [facility]   DATE & TIME :    March 2nd at 3:30pm   PHONE :     [phone]  FAX :    [phone]  Appointment made by clinic staff/:    Kimberly

## 2020-09-29 ENCOUNTER — OFFICE VISIT (OUTPATIENT)
Dept: FAMILY MEDICINE | Facility: CLINIC | Age: 50
End: 2020-09-29
Payer: COMMERCIAL

## 2020-09-29 VITALS
RESPIRATION RATE: 16 BRPM | WEIGHT: 140 LBS | BODY MASS INDEX: 24.8 KG/M2 | HEART RATE: 83 BPM | SYSTOLIC BLOOD PRESSURE: 131 MMHG | TEMPERATURE: 98.5 F | DIASTOLIC BLOOD PRESSURE: 92 MMHG | HEIGHT: 63 IN | OXYGEN SATURATION: 97 %
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
## 2020-09-29 DIAGNOSIS — Z23 NEED FOR PROPHYLACTIC VACCINATION AND INOCULATION AGAINST INFLUENZA: ICD-10-CM

## 2020-09-29 DIAGNOSIS — E11.9 TYPE 2 DIABETES MELLITUS WITHOUT COMPLICATION, WITHOUT LONG-TERM CURRENT USE OF INSULIN (H): ICD-10-CM

## 2020-09-29 LAB
CHOLEST SERPL-MCNC: 279 MG/DL
CREAT UR-MCNC: 171.1 MG/DL
FASTING?: ABNORMAL
HBA1C MFR BLD: 9.9 % (ref 4.1–5.7)
HDLC SERPL-MCNC: 47 MG/DL
LDLC SERPL CALC-MCNC: ABNORMAL MG/DL
MICROALBUMIN UR-MCNC: 49.09 MG/DL (ref 0–1.99)
MICROALBUMIN/CREAT UR: 286.9 MG/G
TRIGL SERPL-MCNC: 537 MG/DL

## 2020-09-29 RX ORDER — CETIRIZINE HYDROCHLORIDE 10 MG/1
10 TABLET ORAL DAILY
Qty: 20 TABLET | Refills: 0 | Status: SHIPPED | OUTPATIENT
Start: 2020-09-29 | End: 2021-03-12

## 2020-09-29 RX ORDER — TRIAMCINOLONE ACETONIDE 5 MG/G
CREAM TOPICAL 2 TIMES DAILY
Qty: 454 G | Refills: 0 | Status: SHIPPED | OUTPATIENT
Start: 2020-09-29 | End: 2021-03-12

## 2020-09-29 RX ORDER — BLOOD-GLUCOSE METER
EACH MISCELLANEOUS
COMMUNITY
Start: 2020-02-21 | End: 2021-07-12

## 2020-09-29 ASSESSMENT — MIFFLIN-ST. JEOR: SCORE: 1396.29

## 2020-09-29 NOTE — NURSING NOTE
Due to patient being non-English speaking/uses sign language, an  was used for this visit. Only for face-to-face interpretation by an external agency, date and length of interpretation can be found on the scanned worksheet.     name: eliana hernandez  Agency: [employer]  Language: Rimma   Telephone number: [phone]  Type of interpretation: Telephone, spoken

## 2020-09-29 NOTE — PROGRESS NOTES
"SUBJECTIVE:            Zheng Llanos is a 50 year old male, here with son, in today for:    Chief Complaint   Patient presents with     Derm Problem     rashes all over body x Sunday, report having bed bugs     Medication Reconciliation     complete     Imm/Inj     Flu Shot     1. Itchy rash: never happened before, had wife and children 2 of 4  who had this rash last year  -this year she doesn't have any rash at this time  -used wife's cream from last year and didn't work    -did go hunting last week, no rash from those people    A GOSO  was used for this visit  ---------------------------------------------------------------------------------------------------------------------  Patient Active Problem List   Diagnosis     Elevated blood pressure reading without diagnosis of hypertension     Proteinuria     Gastroesophageal reflux disease, esophagitis presence not specified     Type 2 diabetes mellitus, without long-term current use of insulin (H)     Past Surgical History:   Procedure Laterality Date     NO HISTORY OF SURGERY         Social History     Tobacco Use     Smoking status: Never Smoker     Smokeless tobacco: Never Used   Substance Use Topics     Alcohol use: No     History reviewed. No pertinent family history.      ROS:  Denies fevers/chills/cough    Problem list, Medication list, Allergies, and Medical/Social/Surgical histories reviewed in [facility] and updated as appropriate.    OBJECTIVE:                          BP (!) 131/92 (BP Location: Right arm)   Pulse 83   Temp 98.5  F (36.9  C) (Oral)   Resp 16   Ht 1.61 m (5' 3.39\")   Wt 63.5 kg (140 lb)   SpO2 97%   BMI 24.50 kg/m     GENERAL: healthy, alert, well nourished, well hydrated, no distress  SKIN: Diffuse scattered erythematous papules throughout the chest, back, arms, legs     Diagnostic testing:(labs, x-rays, EKG) - none    ASSESSMENT/PLAN:            (W57.XXXA) Bedbug bite, initial encounter  (primary encounter diagnosis)  Comment: " discussed hygiene unsure if it occurred   Plan: triamcinolone (ARISTOCORT HP) 0.5 % external         cream, cetirizine (ZYRTEC) 10 MG tablet        Recheck rash in 2 weeks, use of oral antihistamine to help itching.  Letter for missed work due to rash authorized.    (E11.9) Type 2 diabetes mellitus without complication, without long-term current use of insulin (H)  Comment: overdue for DM testing  Plan: Hemoglobin A1c ([facility] ), [facility]albumin [facility] Ur        [facility] Lipid Profile ([facility]),         CANCELED: Lipid Panel ([facility])        Recommend following up with this in 2 weeks    (Z23) Need for prophylactic vaccination and inoculation against influenza  Comment: agrees  Plan: INFLUENZA VACCINE IM > 6 MONTHS VALENT IIV4         [68880]              Risks, benefits and alternatives of treatments discussed. Plan agreed on.      Followup:2 weeks    Will call, return to clinic, or go to ED if worsening or symptoms not improving as discussed.    See patient instructions.     Health Maintenance Due   Topic Date Due     PREVENTIVE CARE VISIT  1970     LIPID  1970     MICROALBUMIN  1970     DIABETIC FOOT EXAM  1970     EYE EXAM  1970     COLORECTAL CANCER SCREENING  05/10/1980     HIV SCREENING  05/10/1985     HEPATITIS B IMMUNIZATION (1 of 3 - Risk 3-dose series) 05/10/1989     A1C  05/20/2020     INFLUENZA VACCINE (1) 09/01/2020     ZOSTER IMMUNIZATION (1 of 2) 05/10/2020     Health maintenance reviewed/updated? Yes    Odessa Burnett MD  [facility]

## 2020-09-29 NOTE — LETTER
RETURN TO WORK/SCHOOL FORM    9/29/2020    Re: Zheng Llanos  1970      To Whom It May Concern:     Zheng Llanos was seen in clinic today.  He may return to work without restrictions on 10/30/20.            Odessa Burnett MD  9/29/2020 4:31 PM

## 2020-10-01 NOTE — RESULT ENCOUNTER NOTE
Call patient:      Remind of upcoming appointment to review diabetes and cholesterol results.  Will be important to adjust medications for diabetes, redraw for direct LDL measurement and see how the rash is doing.  The numbers suggest will need to start cholesterol lowering medication.  Needs diabetic foot exam.

## 2020-10-02 NOTE — RESULT ENCOUNTER NOTE
Called but no answer.     SHIRA Yañez (Reina) Romi [facility] [address] [place] [id number] [phone]

## 2020-10-02 NOTE — RESULT ENCOUNTER NOTE
Called and reminder given.     SHIRA Yañez (Reina) Romi [facility] [address] [place] [id number] [phone]

## 2020-10-07 ENCOUNTER — OFFICE VISIT (OUTPATIENT)
Dept: FAMILY MEDICINE | Facility: CLINIC | Age: 50
End: 2020-10-07
Payer: COMMERCIAL

## 2020-10-07 VITALS
BODY MASS INDEX: 25.09 KG/M2 | WEIGHT: 141.6 LBS | RESPIRATION RATE: 18 BRPM | OXYGEN SATURATION: 99 % | HEART RATE: 75 BPM | HEIGHT: 63 IN | TEMPERATURE: 97.8 F | DIASTOLIC BLOOD PRESSURE: 90 MMHG | SYSTOLIC BLOOD PRESSURE: 127 MMHG
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
## 2020-10-07 DIAGNOSIS — E11.9 TYPE 2 DIABETES MELLITUS WITHOUT COMPLICATION, WITHOUT LONG-TERM CURRENT USE OF INSULIN (H): ICD-10-CM

## 2020-10-07 PROCEDURE — 99214 OFFICE O/P EST MOD 30 MIN: CPT | Mod: GC | Performed by: STUDENT IN AN ORGANIZED HEALTH CARE EDUCATION/TRAINING PROGRAM

## 2020-10-07 ASSESSMENT — MIFFLIN-ST. JEOR: SCORE: 1397.42

## 2020-10-07 NOTE — LETTER
RETURN TO WORK/SCHOOL FORM    10/7/2020    Re: Zheng Llanos  1970      To Whom It May Concern:     Zheng Llanos was seen in clinic today..  He may return to work without restrictions on 10/8/20          Restrictions:  None      Mateus Luis MD  10/7/2020 9:02 AM

## 2020-10-07 NOTE — PROGRESS NOTES
Assessment and Plan   (W57.XXXD) Bedbug bite, subsequent encounter  (primary encounter diagnosis)  Comment: Continues to have numerous erythematous papules, but appear to be resolving and the previous meds are working well.   Plan:   - Continue Triamcinalone cream  - Continue cetirizine  - Work letter    (E11.9) Type 2 diabetes mellitus without complication, without long-term current use of insulin (H)  Comment: Recent A1C 9.9, was 13.5 previously. Currently taking Metformin 1000mg BID. Given the significant improvement, will continue this regimen and follow up in a few months.  Plan:   - Continue Metformin  - Follow up 3 months        Options for treatment and follow-up care were reviewed with the patient and/or guardian. Zheng Llanos and/or guardian engaged in the decision making process and verbalized understanding of the options discussed and agreed with the final plan.    Sascha Luis DO, MBA  [facility], PGY-2    Precepted patient with Dr. Nicholas Ratliff       HPI:   Zheng Llanos is a 50 year old male who presents to clinic today for   Chief Complaint   Patient presents with     Derm Problem     rash all over body     Medication Reconciliation     not complete     Red Spots:  - Located chest, back, arms, legs  - appeared first 1-2 weeks ago after hunting trip   - Much better, but still there  - Itching improved as well  - No one in house having this issue       A [employer]  was used for this visit    Denies F, CP, SOB, changes in vision/hearing/GI//diet, abdominal pain, numbness/tingling, or any other concerns.         PMHX:   Active Problems List  Patient Active Problem List   Diagnosis     Elevated blood pressure reading without diagnosis of hypertension     Proteinuria     Gastroesophageal reflux disease, esophagitis presence not specified     Type 2 diabetes mellitus, without long-term current use of insulin (H)       Current  "Medications  Current Outpatient Medications   Medication Sig Dispense Refill     blood glucose monitoring XX meter device kit Use to test blood sugar 1-2 times daily or as directed. 1 kit 1     blood glucose VI test strip Use to test blood sugar 1-2 times daily or as directed. 100 strip 11     blood glucose XX lancets standard Use to test blood sugar 1-2 times daily or as directed. 100 each 11     cetirizine (ZYRTEC) 10 MG tablet Take 1 tablet (10 mg) by mouth daily 20 tablet 0     Contour Next EZ (CONTOUR NEXT EZ W/DEVICE KIT) w/Device KIT        metFORMIN (GLUCOPHAGE) 1000 MG tablet Take 1 tablet (1,000 mg) by mouth 2 times daily (with meals) 60 tablet 11     metFORMIN 1000 MG PO tablet Take 1 tablet (1,000 mg) by mouth 2 times daily (with meals) 60 tablet 1     triamcinolone (ARISTOCORT HP) 0.5 % external cream Apply topically 2 times daily 454 g 0       Social History  Social History     Tobacco Use     Smoking status: Never Smoker     Smokeless tobacco: Never Used   Substance Use Topics     Alcohol use: No     Drug use: No     History   Drug Use No       Family History  No family history on file.    Allergies  Allergies   Allergen Reactions     No Known Allergies             Physical Exam:     Vitals:    10/07/20 0828   BP: (!) 127/90   Pulse: 75   Resp: 18   Temp: 97.8  F (36.6  C)   TempSrc: Oral   SpO2: 99%   Weight: 64.2 kg (141 lb 9.6 oz)   Height: 1.6 m (5' 3\")     Body mass index is 25.08 kg/m .    GENERAL APPEARANCE: alert, appears stated age, no acute distress  HEENT: Eyes grossly normal to inspection, nares normal, and mouth and throat without erythema, ulcers, or lesions  RESP: lungs clear to auscultation - no rales, rhonchi, or wheezes  CV: regular rate and rhythm, no murmur, click, rub, or gallop  SKIN: Diffuse scattered erythematous papules chest, arms, legs.  NEURO:mentation appears intact and speech normal  PSYCH: mood and affect normal/bright           "

## 2020-10-07 NOTE — NURSING NOTE
Due to patient being non-English speaking/uses sign language, an  was used for this visit. Only for face-to-face interpretation by an external agency, date and length of interpretation can be found on the scanned worksheet.     name: Yves  Agency: [employer]  Language: [place]   Telephone number: [phone]  Type of interpretation: Telephone, spoken

## 2020-10-28 NOTE — PROGRESS NOTES
Preceptor Attestation:  Patient's case reviewed and discussed with Mateus Luis MD resident and I evaluated the patient. I agree with written assessment and plan of care.  Supervising Physician:  Nicholas Ratliff MD, MD [place]  [facility]

## 2021-02-17 ENCOUNTER — OFFICE VISIT (OUTPATIENT)
Dept: FAMILY MEDICINE | Facility: CLINIC | Age: 51
End: 2021-02-17
Payer: COMMERCIAL

## 2021-02-17 VITALS
RESPIRATION RATE: 16 BRPM | BODY MASS INDEX: 24.95 KG/M2 | OXYGEN SATURATION: 97 % | SYSTOLIC BLOOD PRESSURE: 147 MMHG | TEMPERATURE: 97.4 F | WEIGHT: 140.8 LBS | HEART RATE: 61 BPM | HEIGHT: 63 IN | DIASTOLIC BLOOD PRESSURE: 101 MMHG

## 2021-02-17 DIAGNOSIS — I10 BENIGN ESSENTIAL HYPERTENSION: ICD-10-CM

## 2021-02-17 DIAGNOSIS — E11.9 TYPE 2 DIABETES MELLITUS WITHOUT COMPLICATION, WITHOUT LONG-TERM CURRENT USE OF INSULIN (H): Primary | ICD-10-CM

## 2021-02-17 LAB
ANION GAP SERPL CALCULATED.3IONS-SCNC: 10 MMOL/L (ref 5–18)
BUN SERPL-MCNC: 26 MG/DL (ref 8–22)
CALCIUM SERPL-MCNC: 8.6 MG/DL (ref 8.5–10.5)
CHLORIDE SERPL-SCNC: 108 MMOL/L (ref 98–107)
CO2 SERPL-SCNC: 21 MMOL/L (ref 22–31)
CREAT SERPL-MCNC: 1.75 MG/DL (ref 0.7–1.3)
GLUCOSE SERPL-MCNC: 276 MG/DL (ref 70–125)
HBA1C MFR BLD: 10.7 % (ref 4.1–5.7)
POTASSIUM SERPL-SCNC: 3.9 MMOL/L (ref 3.5–5)
SODIUM SERPL-SCNC: 139 MMOL/L (ref 136–145)

## 2021-02-17 PROCEDURE — 36415 COLL VENOUS BLD VENIPUNCTURE: CPT | Performed by: STUDENT IN AN ORGANIZED HEALTH CARE EDUCATION/TRAINING PROGRAM

## 2021-02-17 PROCEDURE — 99214 OFFICE O/P EST MOD 30 MIN: CPT | Mod: GC | Performed by: STUDENT IN AN ORGANIZED HEALTH CARE EDUCATION/TRAINING PROGRAM

## 2021-02-17 PROCEDURE — 83036 HEMOGLOBIN GLYCOSYLATED A1C: CPT | Performed by: STUDENT IN AN ORGANIZED HEALTH CARE EDUCATION/TRAINING PROGRAM

## 2021-02-17 RX ORDER — LISINOPRIL 5 MG/1
5 TABLET ORAL DAILY
Qty: 30 TABLET | Refills: 3 | Status: SHIPPED | OUTPATIENT
Start: 2021-02-17 | End: 2021-02-28 | Stop reason: SINTOL

## 2021-02-17 ASSESSMENT — MIFFLIN-ST. JEOR: SCORE: 1390.53

## 2021-02-17 NOTE — PROGRESS NOTES
Assessment and Plan   1. Type 2 diabetes mellitus without complication, without long-term current use of insulin (H)  Hx of poorly controlled T2DM. Continue on Metformin and recheck A1c. F/u in 1 month.  - Hemoglobin A1c ([facility])  - Basic Metabolic Panel ([facility]) - Results < 1 hr    2. Benign essential hypertension  Pt with DM and history of chronically high BP. Initiate treatment for HTN.  - lisinopril (ZESTRIL) 5 MG tablet; Take 1 tablet (5 mg) by mouth daily  Dispense: 30 tablet; Refill: 3  - BMP    3. Body aches  Pt advised that stress and lack of sleep may cause excess fatigue and body aches. Will initiate treatment for HTN and reevaluate T2DM treatment with reevaluation of his chronic body aches at a later date.    Options for treatment and follow-up care were reviewed with the patient and/or guardian. Zheng Llanos and/or guardian engaged in the decision making process and verbalized understanding of the options discussed and agreed with the final plan.    Seen and discussed with resident who will discuss with staff.    Neal Garcia, MS3  Medical Student    I was present with the medical student who participated in the service and in the documentation of this note. I have verified the history and personally performed the physical exam and medical decision making, and have verified the content of the note, which accurately reflects my assessment of the patient and the plan of care.   MD Sascha Pham DO, NINA  [facility], PGY-2    Precepted patient with Dr. Lyndon Gallardo       HPI:   Zheng Llanos is a 50 year old male who presents to clinic today for   Chief Complaint   Patient presents with     Diabetes     follow-up     Medication Reconciliation     needs attention     Insomnia     not cable to sleep at night, started 3-4 months ago       Diabetes  Pt had abnormal labs from a life insurance check-up and would like to  follow up. Abnormally high labs included glucose, BUN, Uric acid, AST, ALT, GGT, Triglycerides, cholesterol, and UA protein. Pt has a history of poorly controlled T2DM so this was the main focus of this exam.  -Previous A1c 9.9 9/29, 13.5 2/20  -Takes metformin daily  -Hasn't been checking BS lately because he forgets  -No increased thirst or urination  -No neuropathy no decreased sensation    Body aches  -Tightness and aching of body everywhere  -has had since 5 years old  -Not able to sleep for last 3-4 months   -No excess tireness  -works 3rd shift 10 years  -3 hours of restful sleep   -Pt does exercise 3 times per week    A Int[employer]  was used for this visit    Denies F, CP, SOB, changes in vision/hearing/GI//diet, abdominal pain, numbness/tingling, or any other concerns.         PMHX:   Active Problems List  Patient Active Problem List   Diagnosis     Proteinuria     Gastroesophageal reflux disease, esophagitis presence not specified     Type 2 diabetes mellitus, without long-term current use of insulin (H)     Benign essential hypertension       Current Medications  Current Outpatient Medications   Medication Sig Dispense Refill     blood glucose monitoring XX meter device kit Use to test blood sugar 1-2 times daily or as directed. 1 kit 1     blood glucose VI test strip Use to test blood sugar 1-2 times daily or as directed. 100 strip 11     blood glucose XX lancets standard Use to test blood sugar 1-2 times daily or as directed. 100 each 11     Contour Next EZ (CONTOUR NEXT EZ W/DEVICE KIT) w/Device KIT        lisinopril (ZESTRIL) 5 MG tablet Take 1 tablet (5 mg) by mouth daily 30 tablet 3     metFORMIN (GLUCOPHAGE) 1000 MG tablet Take 1 tablet (1,000 mg) by mouth 2 times daily (with meals) 60 tablet 11     metFORMIN 1000 MG PO tablet Take 1 tablet (1,000 mg) by mouth 2 times daily (with meals) 60 tablet 1     cetirizine (ZYRTEC) 10 MG tablet Take 1 tablet (10 mg) by mouth daily (Patient not taking:  "Reported on 2/17/2021) 20 tablet 0     triamcinolone (ARISTOCORT HP) 0.5 % external cream Apply topically 2 times daily 454 g 0       Social History  Social History     Tobacco Use     Smoking status: Never Smoker     Smokeless tobacco: Never Used   Substance Use Topics     Alcohol use: No     Drug use: No     History   Drug Use No       Family History  Family History   Problem Relation Age of Onset     Heart Disease No family hx of      Diabetes No family hx of      Cancer No family hx of        Allergies  Allergies   Allergen Reactions     No Known Allergies             Physical Exam:     Vitals:    02/17/21 1451 02/17/21 1454   BP: (!) 169/112 (!) 147/101   BP Location: Right arm Right arm   Cuff Size: Adult Regular Adult Regular   Pulse: 61    Resp: 16    Temp: 97.4  F (36.3  C)    TempSrc: Oral    SpO2: 97%    Weight: 63.9 kg (140 lb 12.8 oz)    Height: 1.595 m (5' 2.8\")      Body mass index is 25.1 kg/m .    GENERAL APPEARANCE: alert, appears stated age, no acute distress  HEENT: Eyes grossly normal to inspection, nares normal, and mouth and throat without erythema, ulcers, or lesions  RESP: lungs clear to auscultation - no rales, rhonchi, or wheezes  CV: regular rate and rhythm, no murmur, click, rub, or gallop  ABDOMEN: soft, nontender   MSK: extremities normal, no gross deformities noted, no lower extremity edema  SKIN: no suspicious lesions or rashes   NEURO: Normal strength and tone, sensory exam grossly normal, mentation appears intact and speech normal  PSYCH: mood and affect normal/bright       "

## 2021-02-26 ENCOUNTER — TELEPHONE (OUTPATIENT)
Dept: FAMILY MEDICINE | Facility: CLINIC | Age: 51
End: 2021-02-26

## 2021-02-26 ENCOUNTER — VIRTUAL VISIT (OUTPATIENT)
Dept: FAMILY MEDICINE | Facility: CLINIC | Age: 51
End: 2021-02-26
Payer: COMMERCIAL

## 2021-02-26 DIAGNOSIS — I10 BENIGN ESSENTIAL HYPERTENSION: Primary | ICD-10-CM

## 2021-02-26 DIAGNOSIS — E11.9 TYPE 2 DIABETES MELLITUS WITHOUT COMPLICATION, WITHOUT LONG-TERM CURRENT USE OF INSULIN (H): ICD-10-CM

## 2021-02-26 PROCEDURE — 99213 OFFICE O/P EST LOW 20 MIN: CPT | Mod: 95 | Performed by: STUDENT IN AN ORGANIZED HEALTH CARE EDUCATION/TRAINING PROGRAM

## 2021-02-26 RX ORDER — LOSARTAN POTASSIUM 25 MG/1
25 TABLET ORAL DAILY
Qty: 30 TABLET | Refills: 3 | Status: SHIPPED | OUTPATIENT
Start: 2021-02-26 | End: 2021-03-12 | Stop reason: DRUGHIGH

## 2021-02-26 NOTE — PROGRESS NOTES
I have personally reviewed the telephone encounter as documented by Dr. Ramirez.  I agree with the assessment and plan as documented for 50 yr old male with male evaluated for HTN. Will adjust meds to help with side effects. Precautions given. Anticipatory guidance given.     Jj Adhikari MD  February 26, 2021  2:49 PM

## 2021-02-26 NOTE — TELEPHONE ENCOUNTER
[facility] phone call message- general phone call:    Reason for call: Caller state that patient was just prescribed a new medication and after taking that medication patient is experiencing back pain. Caller would like for a nurse to call back today to discuss further if medication should be continued or further advise next step. Caller does not remember name of medication.     Return call needed: Yes    OK to leave a message on voice mail? Yes    Primary language: ong      needed? Yes    Call taken on February 26, 2021 at 12:44 PM by Charlie Llanos

## 2021-02-26 NOTE — TELEPHONE ENCOUNTER
An  was used for this call. Called patient to discuss. Patient experiencing back pain after taking lisinopril x2. Patient stopped taking the medication yesterday and is wanting an alternative. Advised will require appointment, patient scheduled for telephone visit at 2:20 today with  to further discuss. Yaima MOTLEY

## 2021-02-26 NOTE — PROGRESS NOTES
Family Medicine Telephone Visit Note       Chief Complaint   Patient presents with     Recheck Medication     side affects of BP medication lisinopril- discuss alternative- quit medication on 2/25/21 due to pain      Medication Reconciliation     partial completedtion      Due to patient being non-English speaking/uses sign language, an  was used for this visit. Only for face-to-face interpretation by an external agency, date and length of interpretation can be found on the scanned worksheet.     name: [id number]  Agency: [employer] - telephone  Language: [facility]   Telephone number:   Type of interpretation: Telephone, spoken         HPI   Patients name: Zheng  Appointment start time:  2:34 PM  -Was seen on Monday, was prescribed a medication and noticed significant back pain when taking this medication  -took this for two days then stopped  -notices pain in his lower back and into his buttocks  -Pain resolved when stopping this medication  -Not checking BP  -Interested in starting new medication today      Current Outpatient Medications   Medication Sig Dispense Refill     metFORMIN (GLUCOPHAGE) 1000 MG tablet Take 1 tablet (1,000 mg) by mouth 2 times daily (with meals) 60 tablet 11     blood glucose monitoring XX meter device kit Use to test blood sugar 1-2 times daily or as directed. 1 kit 1     blood glucose VI test strip Use to test blood sugar 1-2 times daily or as directed. 100 strip 11     blood glucose XX lancets standard Use to test blood sugar 1-2 times daily or as directed. 100 each 11     cetirizine (ZYRTEC) 10 MG tablet Take 1 tablet (10 mg) by mouth daily (Patient not taking: Reported on 2/17/2021) 20 tablet 0     Contour Next EZ (CONTOUR NEXT EZ W/DEVICE KIT) w/Device KIT        lisinopril (ZESTRIL) 5 MG tablet Take 1 tablet (5 mg) by mouth daily (Patient not taking: Reported on 2/26/2021) 30 tablet 3     metFORMIN 1000 MG PO tablet Take 1 tablet (1,000 mg) by mouth 2 times  "daily (with meals) 60 tablet 1     triamcinolone (ARISTOCORT HP) 0.5 % external cream Apply topically 2 times daily 454 g 0     Allergies   Allergen Reactions     No Known Allergies               Review of Systems:     Constitutional, HEENT, cardiovascular, pulmonary, gi and gu systems are negative, except as otherwise noted.         Physical Exam:     There were no vitals taken for this visit.  Estimated body mass index is 25.1 kg/m  as calculated from the following:    Height as of 2/17/21: 1.595 m (5' 2.8\").    Weight as of 2/17/21: 63.9 kg (140 lb 12.8 oz).    Exam:  Constitutional: healthy, alert and no distress  Psychiatric: mentation appears normal and affect normal/bright          Assessment and Plan     1. Benign essential hypertension  Patient with myalgias after starting lisinopril.Reassuring that myalgias resolved after stopping medicine.  Per uptodate 1-10% patients with myalgias with lisinopril. Notably less with losartan.  Will switch to this today as needs ACE/ARB with history of proteinuria. Follow up in 2 weeks to recheck BP and BMP.   - losartan (COZAAR) 25 MG tablet; Take 1 tablet (25 mg) by mouth daily  Dispense: 30 tablet; Refill: 3    2. Type 2 diabetes mellitus without complication, without long-term current use of insulin (H)  Needs refill of test strips.  - blood glucose (NO BRAND SPECIFIED) test strip; Use to test blood sugar 1-2 times daily or as directed.  Dispense: 100 strip; Refill: 11        Refilled medications that would be required in the next 3 months.     After Visit Information:  Patient declined AVS     No follow-ups on file.    Appointment end time: 2:45PM  This is a telephone visit that took 11 minutes.      Clinician location:  [facility]     El Jass Ramirez MD  I precepted today with Dr. Adhikari.          "

## 2021-03-11 ENCOUNTER — OFFICE VISIT (OUTPATIENT)
Dept: FAMILY MEDICINE | Facility: CLINIC | Age: 51
End: 2021-03-11
Payer: COMMERCIAL

## 2021-03-11 VITALS
BODY MASS INDEX: 25.23 KG/M2 | SYSTOLIC BLOOD PRESSURE: 131 MMHG | HEART RATE: 80 BPM | HEIGHT: 63 IN | WEIGHT: 142.4 LBS | DIASTOLIC BLOOD PRESSURE: 91 MMHG | TEMPERATURE: 97.7 F | OXYGEN SATURATION: 97 % | RESPIRATION RATE: 16 BRPM

## 2021-03-11 DIAGNOSIS — I10 BENIGN ESSENTIAL HYPERTENSION: Primary | ICD-10-CM

## 2021-03-11 DIAGNOSIS — E11.9 TYPE 2 DIABETES MELLITUS WITHOUT COMPLICATION, WITHOUT LONG-TERM CURRENT USE OF INSULIN (H): ICD-10-CM

## 2021-03-11 PROCEDURE — 99214 OFFICE O/P EST MOD 30 MIN: CPT | Mod: GC | Performed by: STUDENT IN AN ORGANIZED HEALTH CARE EDUCATION/TRAINING PROGRAM

## 2021-03-11 RX ORDER — LOSARTAN POTASSIUM 50 MG/1
50 TABLET ORAL DAILY
Qty: 30 TABLET | Refills: 3 | Status: SHIPPED | OUTPATIENT
Start: 2021-03-11 | End: 2021-04-23

## 2021-03-11 ASSESSMENT — MIFFLIN-ST. JEOR: SCORE: 1404.05

## 2021-03-11 NOTE — LETTER
RETURN TO WORK/SCHOOL FORM    3/11/2021    Re: Zheng Llanos  1970      To Whom It May Concern:     Zheng Llanos was seen in clinic today..  He may return to work without restrictions on 3/13/21            Mateus Luis MD  3/11/2021 3:52 PM

## 2021-03-11 NOTE — PROGRESS NOTES
Assessment and Plan     HTN:  - BMP  -       Options for treatment and follow-up care were reviewed with the patient and/or guardian. Zheng Llanos and/or guardian engaged in the decision making process and verbalized understanding of the options discussed and agreed with the final plan.    Sascha Luis DO, NINA  [facility], PGY-2    Precepted patient with {[facility] Preceptors:[id number]}       HPI:   Zheng Llanos is a 50 year old male who presents to clinic today for   Chief Complaint   Patient presents with     Medication Reconciliation     Recheck Medication     follow-up     BP Follow Up:  - Started on Losartan 2 weeks ago after having body aches from other medicine (lisinopril 5mg)  -     A [employer]  was used for this visit    Denies F, CP, SOB, changes in vision/hearing/GI//diet, abdominal pain, numbness/tingling, or any other concerns.         PMHX:   Active Problems List  Patient Active Problem List   Diagnosis     Proteinuria     Gastroesophageal reflux disease, esophagitis presence not specified     Type 2 diabetes mellitus, without long-term current use of insulin (H)     Benign essential hypertension       Current Medications  Current Outpatient Medications   Medication Sig Dispense Refill     blood glucose (NO BRAND SPECIFIED) test strip Use to test blood sugar 1-2 times daily or as directed. 100 strip 11     blood glucose monitoring XX meter device kit Use to test blood sugar 1-2 times daily or as directed. 1 kit 1     blood glucose XX lancets standard Use to test blood sugar 1-2 times daily or as directed. 100 each 11     cetirizine (ZYRTEC) 10 MG tablet Take 1 tablet (10 mg) by mouth daily (Patient not taking: Reported on 2/17/2021) 20 tablet 0     Contour Next EZ (CONTOUR NEXT EZ W/DEVICE KIT) w/Device KIT        losartan (COZAAR) 25 MG tablet Take 1 tablet (25 mg) by mouth daily 30 tablet 3     metFORMIN (GLUCOPHAGE) 1000 MG  tablet Take 1 tablet (1,000 mg) by mouth 2 times daily (with meals) 60 tablet 11     metFORMIN 1000 MG PO tablet Take 1 tablet (1,000 mg) by mouth 2 times daily (with meals) 60 tablet 1     triamcinolone (ARISTOCORT HP) 0.5 % external cream Apply topically 2 times daily 454 g 0       Social History  Social History     Tobacco Use     Smoking status: Never Smoker     Smokeless tobacco: Never Used   Substance Use Topics     Alcohol use: No     Drug use: No     History   Drug Use No       Family History  Family History   Problem Relation Age of Onset     Heart Disease No family hx of      Diabetes No family hx of      Cancer No family hx of        Allergies  Allergies   Allergen Reactions     No Known Allergies             Physical Exam:   There were no vitals filed for this visit.  There is no height or weight on file to calculate BMI.    GENERAL APPEARANCE: alert, appears stated age, no acute distress  HEENT: Eyes grossly normal to inspection, nares normal, and mouth and throat without erythema, ulcers, or lesions  RESP: lungs clear to auscultation - no rales, rhonchi, or wheezes  CV: regular rate and rhythm, no murmur, click, rub, or gallop  ABDOMEN: soft, nontender   MSK: extremities normal, no gross deformities noted, no lower extremity edema  SKIN: no suspicious lesions or rashes   NEURO: Normal strength and tone, sensory exam grossly normal, mentation appears intact and speech normal  PSYCH: mood and affect normal/bright

## 2021-03-11 NOTE — NURSING NOTE
Due to patient being non-English speaking/uses sign language, an  was used for this visit. Only for face-to-face interpretation by an external agency, date and length of interpretation can be found on the scanned worksheet.     name: Rosibel Llanos  Agency: [employer]  Language: [place]   Telephone number: [phone]  Type of interpretation: Telephone, spoken

## 2021-03-12 ENCOUNTER — OFFICE VISIT (OUTPATIENT)
Dept: FAMILY MEDICINE | Facility: CLINIC | Age: 51
End: 2021-03-12
Payer: COMMERCIAL

## 2021-03-12 VITALS
HEIGHT: 63 IN | RESPIRATION RATE: 16 BRPM | WEIGHT: 139.8 LBS | OXYGEN SATURATION: 95 % | HEART RATE: 93 BPM | DIASTOLIC BLOOD PRESSURE: 80 MMHG | BODY MASS INDEX: 24.77 KG/M2 | SYSTOLIC BLOOD PRESSURE: 103 MMHG | TEMPERATURE: 97.9 F

## 2021-03-12 DIAGNOSIS — Z13.9 SCREENING FOR CONDITION: ICD-10-CM

## 2021-03-12 DIAGNOSIS — M10.9 ACUTE GOUTY ARTHRITIS: Primary | ICD-10-CM

## 2021-03-12 DIAGNOSIS — I10 BENIGN ESSENTIAL HYPERTENSION: ICD-10-CM

## 2021-03-12 LAB
BUN SERPL-MCNC: 21 MG/DL (ref 7–30)
CALCIUM SERPL-MCNC: 10 MG/DL (ref 8.5–10.4)
CHLORIDE SERPLBLD-SCNC: 100 MMOL/L (ref 94–109)
CO2 SERPL-SCNC: 25 MMOL/L (ref 20–32)
CREAT SERPL-MCNC: 1.5 MG/DL (ref 0.8–1.5)
EGFR CALCULATED (BLACK REFERENCE): 63.7 ML/MIN
EGFR CALCULATED (NON BLACK REFERENCE): 52.7 ML/MIN
GLUCOSE SERPL-MCNC: 226 MG/DL (ref 60–109)
POTASSIUM SERPL-SCNC: 3.8 MMOL/L (ref 3.4–5.3)
SODIUM SERPL-SCNC: 140 MMOL/L (ref 133–144)
URATE SERPL-MCNC: 8.1 MG/DL (ref 3–8)

## 2021-03-12 PROCEDURE — 36415 COLL VENOUS BLD VENIPUNCTURE: CPT | Performed by: STUDENT IN AN ORGANIZED HEALTH CARE EDUCATION/TRAINING PROGRAM

## 2021-03-12 PROCEDURE — 80048 BASIC METABOLIC PNL TOTAL CA: CPT | Performed by: STUDENT IN AN ORGANIZED HEALTH CARE EDUCATION/TRAINING PROGRAM

## 2021-03-12 PROCEDURE — 99213 OFFICE O/P EST LOW 20 MIN: CPT | Mod: GC | Performed by: STUDENT IN AN ORGANIZED HEALTH CARE EDUCATION/TRAINING PROGRAM

## 2021-03-12 RX ORDER — PREDNISONE 20 MG/1
20 TABLET ORAL DAILY
Qty: 5 TABLET | Refills: 0 | Status: SHIPPED | OUTPATIENT
Start: 2021-03-12 | End: 2021-03-17

## 2021-03-12 ASSESSMENT — MIFFLIN-ST. JEOR: SCORE: 1389.26

## 2021-03-12 NOTE — PATIENT INSTRUCTIONS
Patient Education     Eating to Prevent Gout  Gout is a painful form of arthritis caused by an excess of uric acid. This is a waste product made by the body. It builds up in the body and forms crystals that collect in the joints, causing a gout attack. Alcohol and certain foods can trigger a gout attack. Below are some guidelines for changing your diet to help you manage gout. Your healthcare provider can work with you to determine the best eating plan for you. You can learn which foods affect your gout more than others. Reactions to different foods can vary from person to person. Know that diet is only one part of managing gout. Take your medicines as prescribed and follow the other guidelines your healthcare provider has given you.   Foods to limit  Eating too many foods containing purines may increase the levels of uric acid in your body and increase your risk for a gout attack. It may be best to limit these high-purine foods:     Alcohol (beer, hard liquor and red wine). You may be told to give up alcohol completely.    Certain fish (anchovies, sardines, fish roes, herring, tuna, mussels, codfish, scallops, trout, and maría)    Certain meats (red meat, processed meat, peters, turkey, wild game, and goose)    Sauces and gravies made with meat    Organ meats (such as liver, kidneys, sweetbreads, and tripe)    Legumes (such as dried beans and peas)    Mushrooms, spinach, asparagus, and cauliflower    Yeast and yeast extract supplements  Foods to try  Some foods may be helpful for people with gout. You may want to try adding some of the following foods to your diet:     Dark berries. These include blueberries, blackberries, and cherries. These berries contain chemicals that may lower uric acid.    Tofu. Tofu, which is made from soy, is a good source of protein. Studies have shown that it may be a better choice than meat for people with gout.    Omega fatty acids. These acids are found in fatty fish (such as  salmon), certain oils (such as flax, olive, or nut oils), or nuts. They may help prevent inflammation due to gout.  Gout guidelines  The following guidelines are recommended by the Academy of Nutrition and Dietetics for people with gout. Your diet should be:     High in fiber, whole grains, fruits, and vegetables    Low in protein. About 15% of calories should come from protein. Choose lean sources, such as soy, lean meats, and poultry without the skin.    Low in fat. No more than 30% of calories should come from fat, with only 10% coming from animal (saturated) fat.  Nisha last reviewed this educational content on 8/1/2020 2000-2020 [employer]. All rights reserved. This information is not intended as a substitute for professional medical care. Always follow your healthcare professional's instructions.

## 2021-03-12 NOTE — PROGRESS NOTES
LEMUEL Llanos is a 50 year old male with past medical history significant for hypertension and Type II diabetes mellitus who presents for:    Chief Complaint   Patient presents with     Arthritis     left foot     Medication Reconciliation     Left foot 1st MTP joint pain  1 week  Warmth and redness and swelling  Otherwise feeling well  No fever  Getting worse since yesterday  Makes it better: unknown, has not used ice or medications for this at home  Makes it worse: unknown  No prior history of gout    Last seen here yesterday by PCP for chronic HTN follow-up  Taking anti-hypertensives as prescribed    BP Readings from Last 6 Encounters:   03/12/21 103/80   03/11/21 (!) 131/91   02/17/21 (!) 147/101   10/07/20 (!) 127/90   09/29/20 (!) 131/92   02/27/20 114/76     Recent A1c 10.7    Social history: Lives with wife and children.  Works in packaging.  Could not work yesterday due to pain.  Tobacco use: no  ETOH: no  Illicit drug use: no    [facility]  [place].    Patient Active Problem List   Diagnosis     Proteinuria     Gastroesophageal reflux disease, esophagitis presence not specified     Type 2 diabetes mellitus, without long-term current use of insulin (H)     Benign essential hypertension       Current Outpatient Medications   Medication Sig Dispense Refill     blood glucose (NO BRAND SPECIFIED) test strip Use to test blood sugar 1-2 times daily or as directed. 100 strip 11     blood glucose monitoring XX meter device kit Use to test blood sugar 1-2 times daily or as directed. 1 kit 1     blood glucose XX lancets standard Use to test blood sugar 1-2 times daily or as directed. 100 each 11     Contour Next EZ (CONTOUR NEXT EZ W/DEVICE KIT) w/Device KIT        empagliflozin (JARDIANCE) 10 MG TABS tablet Take 1 tablet (10 mg) by mouth daily 30 tablet 3     losartan (COZAAR) 50 MG tablet Take 1 tablet (50 mg) by mouth daily 30 tablet 3     metFORMIN (GLUCOPHAGE) 1000 MG  "tablet Take 1 tablet (1,000 mg) by mouth 2 times daily (with meals) 60 tablet 11     metFORMIN 1000 MG PO tablet Take 1 tablet (1,000 mg) by mouth 2 times daily (with meals) 60 tablet 1          Allergies   Allergen Reactions     No Known Allergies             Review of Systems:   Complete 10-point ROS negative except as per HPI           Physical Exam:     Vitals:    03/12/21 1533   BP: 103/80   BP Location: Right arm   Cuff Size: Adult Regular   Pulse: 93   Resp: 16   Temp: 97.9  F (36.6  C)   TempSrc: Oral   SpO2: 95%   Weight: 63.4 kg (139 lb 12.8 oz)   Height: 1.6 m (5' 3\")     Body mass index is 24.76 kg/m .    GENERAL: healthy, alert and no distress  RESP: lungs clear to auscultation - no rales, no rhonchi, no wheezes  CV: regular rates and rhythm, normal S1 S2, no S3 or S4 and no murmur, no click or rub  MS: extremities- skin overlying left foot 1st MTP joint erythematous and warm with swelling of the joint that extends to dorsum of foot (see photo)            Assessment and Plan     Zheng was seen today for arthritis and medication reconciliation.    Diagnoses and all orders for this visit:    Acute gouty arthritis  First-time episode.  Start short course prednisone.  Discussed expect glucose values to be elevated while on prednisone; continue current diabetes medications.  Discussed dietary modifications that can help prevent future gout attacks.  Check uric acid.  Follow-up in 1 week.  Consider gout prophylaxis at follow-up pending uric acid level.  -     predniSONE (DELTASONE) 20 MG tablet; Take 1 tablet (20 mg) by mouth daily for 5 days    Benign essential hypertension  Obtain lab that wasn't drawn prior to patient leaving clinic yesterday.  BMP reviewed at visit today, significant for Cr 1.5 which could represent undiagnosed CKD vs resolving PAL (previous Cr 1.75 three weeks ago).  BP adequately controlled.  Continue current regimen.  -     Basic Metabolic Panel ([facility])  - Results < 1 hr    Screening " for condition  -     Uric Acid ([facility])    Follow-up for gout in 1 week.  Diabetes follow-up as scheduled with diabetes educator.    Options for treatment and follow-up care were reviewed with the patient and/or guardian. Zheng Diaso and/or guardian engaged in the decision making process and verbalized understanding of the options discussed and agreed with the final plan.  Precepted with Dr. Louise Estrella MD  [facility] Medicine Resident  Pager [phone]

## 2021-03-12 NOTE — NURSING NOTE
Due to patient being non-English speaking/uses sign language, an  was used for this visit. Only for face-to-face interpretation by an external agency, date and length of interpretation can be found on the scanned worksheet.     name: Linda Currie  Agency: [employer]  Language: Rim[place]   Telephone number: [phone]  Type of interpretation: Telephone, spoken

## 2021-03-17 ENCOUNTER — TELEPHONE (OUTPATIENT)
Dept: FAMILY MEDICINE | Facility: CLINIC | Age: 51
End: 2021-03-17

## 2021-03-17 NOTE — TELEPHONE ENCOUNTER
An  was used for this call. Called patient but no answer. Left detailed VM advising vaccine eligibility. Requested call back if wanting COVID vaccine this Saturday 3/20 at our clinic. Yaima MOTLEY

## 2021-03-18 ENCOUNTER — TELEPHONE (OUTPATIENT)
Dept: FAMILY MEDICINE | Facility: CLINIC | Age: 51
End: 2021-03-18

## 2021-03-18 NOTE — TELEPHONE ENCOUNTER
Outgoing call as there was appt note to ask for scheduling vaccine. Patient state he will think about it and call back if interested, advise its by appointment and will book fast.

## 2021-03-19 ENCOUNTER — VIRTUAL VISIT (OUTPATIENT)
Dept: FAMILY MEDICINE | Facility: CLINIC | Age: 51
End: 2021-03-19
Payer: COMMERCIAL

## 2021-03-19 VITALS — SYSTOLIC BLOOD PRESSURE: 140 MMHG | HEART RATE: 94 BPM | DIASTOLIC BLOOD PRESSURE: 95 MMHG

## 2021-03-19 DIAGNOSIS — M10.9 ACUTE GOUTY ARTHRITIS: Primary | ICD-10-CM

## 2021-03-19 PROCEDURE — 99213 OFFICE O/P EST LOW 20 MIN: CPT | Mod: 95 | Performed by: STUDENT IN AN ORGANIZED HEALTH CARE EDUCATION/TRAINING PROGRAM

## 2021-03-19 RX ORDER — PREDNISONE 20 MG/1
40 TABLET ORAL DAILY
Qty: 6 TABLET | Refills: 0 | Status: SHIPPED | OUTPATIENT
Start: 2021-03-19 | End: 2021-04-23

## 2021-03-19 NOTE — PROGRESS NOTES
Family Medicine Telephone Visit Note    Chief Complaint   Patient presents with     RECHECK     follow up Gout     Medication Reconciliation     complete - pt reports no change       Due to patient being non-English speaking/uses sign language, an  was used for this visit. Only for face-to-face interpretation by an external agency, date and length of interpretation can be found on the scanned worksheet.     name: Doretha  Agency: [employer]  Language: [facility]   Telephone number: [phone]  Type of interpretation: Telephone, spoken         HPI   Patients name: Zheng  Appointment start time:  8:45 AM    Follow-up gout   Last seen for this 1 week ago  First time episode  Left 1st MTP joint  Today says feeling about the same as before  Still a lot of pain but swelling has gone down  5-6/10 in severity currently  Foot is also itchy   Finished 5-day prednisone course yesterday  Didn't check glucose at all last week, has glucometer  Noticed BP has been elevated, has only checked in past couple days  Feeling drowsy  Last worked last night  No fever    Uric acid at last visit 8.1      Current Outpatient Medications   Medication Sig Dispense Refill     blood glucose (NO BRAND SPECIFIED) test strip Use to test blood sugar 1-2 times daily or as directed. 100 strip 11     blood glucose monitoring XX meter device kit Use to test blood sugar 1-2 times daily or as directed. 1 kit 1     blood glucose XX lancets standard Use to test blood sugar 1-2 times daily or as directed. 100 each 11     Contour Next EZ (CONTOUR NEXT EZ W/DEVICE KIT) w/Device KIT        empagliflozin (JARDIANCE) 10 MG TABS tablet Take 1 tablet (10 mg) by mouth daily 30 tablet 3     losartan (COZAAR) 50 MG tablet Take 1 tablet (50 mg) by mouth daily 30 tablet 3     metFORMIN (GLUCOPHAGE) 1000 MG tablet Take 1 tablet (1,000 mg) by mouth 2 times daily (with meals) 60 tablet 11     metFORMIN 1000 MG PO tablet Take 1 tablet (1,000 mg) by mouth 2  "times daily (with meals) 60 tablet 1     Allergies   Allergen Reactions     No Known Allergies               Review of Systems:     CONSTITUTIONAL: NEGATIVE for fever, chills, change in weight  RESP: NEGATIVE for significant cough or SOB  CV: NEGATIVE for chest pain, palpitations or peripheral edema         Physical Exam:     BP (!) 140/95   Pulse 94   Estimated body mass index is 24.76 kg/m  as calculated from the following:    Height as of 3/12/21: 1.6 m (5' 3\").    Weight as of 3/12/21: 63.4 kg (139 lb 12.8 oz).    Exam:  Constitutional: healthy, alert and no distress  Psychiatric: mentation appears normal and affect normal/bright          Assessment and Plan     Zheng was seen today for recheck and medication reconciliation.    Diagnoses and all orders for this visit:    Acute gouty arthritis  -     predniSONE (DELTASONE) 20 MG tablet; Take 2 tablets (40 mg) by mouth daily    Limited improvement with 5-day course of 20 mg prednisone.  Increase dose to 40 mg prednisone daily and follow-up in clinic in 3 days.  If not improved at follow-up, consider scheduling arthrocentesis for joint fluid analysis, ddx includes primarily gout vs pseudogout, low suspicion for septic joint considering no fever or other systemic symptoms.    Appointment end time: 9:06 AM  This is a telephone visit that took 21 minutes.      Clinician location:  [facility]     Jada Estrella MD  I precepted today with Phillip Betancur MD.  "

## 2021-03-19 NOTE — PROGRESS NOTES
Preceptor Attestation:    I discussed the patient with the resident. I have verified the content of the note, which accurately reflects my assessment of the patient and the plan of care.   Supervising Physician:  Phillip Betancur MD.

## 2021-03-19 NOTE — Clinical Note
Please ensure follow-up scheduled (virtual OK) with me for Monday 3/22/21.  Reason: gout flare  Thank you

## 2021-03-20 NOTE — PROGRESS NOTES
Preceptor Attestation:   Patient seen, evaluated and discussed with the resident. I have verified the content of the note, which accurately reflects my assessment of the patient and the plan of care.  Supervising Physician:Louise Lo DO [facility]

## 2021-03-24 ENCOUNTER — VIRTUAL VISIT (OUTPATIENT)
Dept: FAMILY MEDICINE | Facility: CLINIC | Age: 51
End: 2021-03-24
Payer: COMMERCIAL

## 2021-03-24 VITALS — HEART RATE: 75 BPM | SYSTOLIC BLOOD PRESSURE: 140 MMHG | DIASTOLIC BLOOD PRESSURE: 85 MMHG

## 2021-03-24 DIAGNOSIS — M10.9 ACUTE GOUTY ARTHRITIS: Primary | ICD-10-CM

## 2021-03-24 PROCEDURE — 99213 OFFICE O/P EST LOW 20 MIN: CPT | Mod: 95 | Performed by: STUDENT IN AN ORGANIZED HEALTH CARE EDUCATION/TRAINING PROGRAM

## 2021-03-24 NOTE — PROGRESS NOTES
Preceptor Attestation:  Patient's case reviewed and discussed with Jada Estrella MD resident and I evaluated the patient. I agree with written assessment and plan of care.  Supervising Physician:  Nicholas Ratliff MD, MD [facility]

## 2021-03-24 NOTE — PROGRESS NOTES
Family Medicine Telephone Visit Note    Chief Complaint   Patient presents with     Arthritis     follow-up,  pt stated it is better     Medication Reconciliation     needs attention          Due to patient being non-English speaking/uses sign language, an  was used for this visit. Only for face-to-face interpretation by an external agency, date and length of interpretation can be found on the scanned worksheet.     name: Cristiane [id number]  Agency: [employer]  Language: Rim[place]   Telephone number: [phone]  CODE: [id number]  Type of interpretation: Telephone, spoken         HPI   Patients name: Zheng  Appointment start time:  9:14 AM    Zheng Llanos is a 51yo male with PMH significant for uncontrolled T2DM, HTN, elevated Cr (CKD vs resolving PAL) who presents for follow-up of first-time acute gout flare left 1st MTP joint.  Previously seen 3/12/21 and prescribed prednisone 20 mg daily for 5 days.  Seen for follow-up 3/19/21, minimal improvement, prescribed prednisone 30 mg daily for 3 days.  Took prednisone 40 mg prescribed at last visit, finished 2 days ago  After took this medication, pain and swelling gone  Foot is back to normal  No concerns today      Current Outpatient Medications   Medication Sig Dispense Refill     blood glucose (NO BRAND SPECIFIED) test strip Use to test blood sugar 1-2 times daily or as directed. 100 strip 11     blood glucose monitoring XX meter device kit Use to test blood sugar 1-2 times daily or as directed. 1 kit 1     blood glucose XX lancets standard Use to test blood sugar 1-2 times daily or as directed. 100 each 11     Contour Next EZ (CONTOUR NEXT EZ W/DEVICE KIT) w/Device KIT        empagliflozin (JARDIANCE) 10 MG TABS tablet Take 1 tablet (10 mg) by mouth daily 30 tablet 3     losartan (COZAAR) 50 MG tablet Take 1 tablet (50 mg) by mouth daily 30 tablet 3     metFORMIN (GLUCOPHAGE) 1000 MG tablet Take 1 tablet (1,000 mg) by mouth 2 times daily (with  "meals) 60 tablet 11     metFORMIN 1000 MG PO tablet Take 1 tablet (1,000 mg) by mouth 2 times daily (with meals) 60 tablet 1     predniSONE (DELTASONE) 20 MG tablet Take 2 tablets (40 mg) by mouth daily (Patient not taking: Reported on 3/24/2021) 6 tablet 0     Allergies   Allergen Reactions     No Known Allergies               Review of Systems:     CONSTITUTIONAL: NEGATIVE for fever, chills, change in weight  MSK: no joint swelling or pain  SKIN: no erythema or rash         Physical Exam:     BP (!) 140/85 (BP Location: Right arm, Cuff Size: Adult Regular)   Pulse 75   Estimated body mass index is 24.76 kg/m  as calculated from the following:    Height as of 3/12/21: 1.6 m (5' 3\").    Weight as of 3/12/21: 63.4 kg (139 lb 12.8 oz).    Exam:  Constitutional: healthy, alert and no distress  Psychiatric: mentation appears normal and affect normal/bright          Assessment and Plan     Zheng was seen today for arthritis and medication reconciliation.    Diagnoses and all orders for this visit:    Acute gouty arthritis    Symptoms resolved s/p prednisone short-course.    Uric acid elevated > 8, however given first time episode will hold off on starting gout prophylaxis.  Discussed again today foods that can increase and decrease risk of recurrent gout flare.    Follow-up in 1 day for diabetes education and in 1 week as scheduled.  At follow-up, consider repeat BMP to re-evaluate kidney function.    Appointment end time: 9:23 AM  This is a telephone visit that took 9 minutes.      Clinician location:  [facility]     Jada Estrella MD  I precepted today with Nicholas Ratliff MD.  "

## 2021-03-25 ENCOUNTER — VIRTUAL VISIT (OUTPATIENT)
Dept: PHARMACY | Facility: CLINIC | Age: 51
End: 2021-03-25
Payer: COMMERCIAL

## 2021-03-25 DIAGNOSIS — Z53.9 ERRONEOUS ENCOUNTER--DISREGARD: Primary | ICD-10-CM

## 2021-04-02 ENCOUNTER — OFFICE VISIT (OUTPATIENT)
Dept: FAMILY MEDICINE | Facility: CLINIC | Age: 51
End: 2021-04-02
Payer: COMMERCIAL

## 2021-04-02 VITALS
WEIGHT: 139 LBS | DIASTOLIC BLOOD PRESSURE: 94 MMHG | RESPIRATION RATE: 18 BRPM | SYSTOLIC BLOOD PRESSURE: 144 MMHG | OXYGEN SATURATION: 96 % | HEART RATE: 85 BPM | BODY MASS INDEX: 24.62 KG/M2

## 2021-04-02 DIAGNOSIS — I10 BENIGN ESSENTIAL HYPERTENSION: ICD-10-CM

## 2021-04-02 DIAGNOSIS — M10.9 ACUTE GOUTY ARTHRITIS: Primary | ICD-10-CM

## 2021-04-02 PROCEDURE — 99214 OFFICE O/P EST MOD 30 MIN: CPT | Mod: GC | Performed by: STUDENT IN AN ORGANIZED HEALTH CARE EDUCATION/TRAINING PROGRAM

## 2021-04-02 RX ORDER — LOSARTAN POTASSIUM 100 MG/1
100 TABLET ORAL DAILY
Qty: 30 TABLET | Refills: 1 | Status: SHIPPED | OUTPATIENT
Start: 2021-04-02 | End: 2021-07-12

## 2021-04-02 NOTE — NURSING NOTE
name: Rosibel Llanos  Language: Callieong  Agency: [facility]  Phone number: [phone]

## 2021-04-02 NOTE — PROGRESS NOTES
HPI:       Zheng Llanos is a 50 year old  male with PMH significant for T2DM, HTN, elevated Cr,  who presents for:      1. Gout flare follow up  -Treated for gout of 1st MTP joint.  - Improvement after prolonged prednisone course. (increased from 20mg daily to 40mg daily, over 8 days)  -Not on gout prophylaxis meds  -recurrence? None  -Feels well.       2. HTN  - Has had elevated Bps at multiple visits multiple days apart.   -Currently taking losartan 50mg daily. Adherent to meds.       [employer]  was used for this visit         PMHX:     Patient Active Problem List   Diagnosis     Proteinuria     Gastroesophageal reflux disease, esophagitis presence not specified     Type 2 diabetes mellitus, without long-term current use of insulin (H)     Benign essential hypertension       Current Outpatient Medications   Medication Sig Dispense Refill     blood glucose (NO BRAND SPECIFIED) test strip Use to test blood sugar 1-2 times daily or as directed. 100 strip 11     blood glucose monitoring XX meter device kit Use to test blood sugar 1-2 times daily or as directed. 1 kit 1     blood glucose XX lancets standard Use to test blood sugar 1-2 times daily or as directed. 100 each 11     Contour Next EZ (CONTOUR NEXT EZ W/DEVICE KIT) w/Device KIT        empagliflozin (JARDIANCE) 10 MG TABS tablet Take 1 tablet (10 mg) by mouth daily 30 tablet 3     losartan (COZAAR) 50 MG tablet Take 1 tablet (50 mg) by mouth daily 30 tablet 3     metFORMIN (GLUCOPHAGE) 1000 MG tablet Take 1 tablet (1,000 mg) by mouth 2 times daily (with meals) 60 tablet 11     metFORMIN 1000 MG PO tablet Take 1 tablet (1,000 mg) by mouth 2 times daily (with meals) 60 tablet 1     predniSONE (DELTASONE) 20 MG tablet Take 2 tablets (40 mg) by mouth daily (Patient not taking: Reported on 3/24/2021) 6 tablet 0       Social History: Reviewed    Family History: Reviewed       Allergies   Allergen Reactions     No Known Allergies        No results found for  this or any previous visit (from the past 24 hour(s)).         Review of Systems:   10 point ROS negative except noted in above in HPI         Physical Exam:     Vitals:    04/02/21 1529 04/02/21 1535   BP: (!) 140/97 (!) 144/94   Pulse: 85    Resp: 18    SpO2: 96%    Weight: 63 kg (139 lb)      Body mass index is 24.62 kg/m .    GENERAL APPEARANCE: healthy, alert and no distress,  EYES: Eyes grossly normal to inspection,  PERRL  RESP: lungs clear to auscultation - no rales, rhonchi or wheezes  CV: regular rate and rhythm,  and no murmur, click,  rub or gallop  MS: extremities normal- no gross deformities noted  SKIN: no suspicious lesions or rashes  PSYCH: mood and affect normal/bright      Assessment and Plan     (M10.9) Acute gouty arthritis  (primary encounter diagnosis)  Comment: improved with treatment regimen of prednisone  Plan: monitor for recurrence. Reiterated dietary management of gout.     (I10) Benign essential hypertension  Comment: Pt with elevated blood pressure on multiple visits. Today 144/94.   Plan: increase BP med losartan (COZAAR) to 100 MG tablet from 50mg        -return in 2-3 weeks for BP follow up visit      Options for treatment and follow-up care were reviewed with the patient and/or guardian. Zheng Llanos and/or guardian engaged in the decision making process and verbalized understanding of the options discussed and agreed with the final plan.      Jaspreet Steiner DO [facility] Resident, PGY-1  Pager: [phone]    Precepted today with: Phillip Betancur MD

## 2021-04-02 NOTE — PATIENT INSTRUCTIONS
Increase losartan to 100mg daily    Plan for follow up visit in 2-3 weeks for labs and BP recheck.

## 2021-04-03 NOTE — PROGRESS NOTES
Unable to reach patient via telephone after multiple attempts.     This encounter was opened in error. Please disregard.    Chen Sheppard, PharmD, CDCES (previously, CDE)  [facility]  Phone: [phone]  April 3, 2021 at 12:10 PM

## 2021-04-13 NOTE — PROGRESS NOTES
Preceptor Attestation:   Patient seen, evaluated and discussed with the resident. I have verified the content of the note, which accurately reflects my assessment of the patient and the plan of care.    Supervising Physician:Phillip Betancur MD    [facility]

## 2021-04-16 ENCOUNTER — OFFICE VISIT (OUTPATIENT)
Dept: FAMILY MEDICINE | Facility: CLINIC | Age: 51
End: 2021-04-16
Payer: COMMERCIAL

## 2021-04-16 VITALS
OXYGEN SATURATION: 95 % | HEART RATE: 85 BPM | BODY MASS INDEX: 25.16 KG/M2 | WEIGHT: 142 LBS | TEMPERATURE: 98 F | HEIGHT: 63 IN | DIASTOLIC BLOOD PRESSURE: 84 MMHG | SYSTOLIC BLOOD PRESSURE: 121 MMHG

## 2021-04-16 DIAGNOSIS — I10 BENIGN ESSENTIAL HYPERTENSION: Primary | ICD-10-CM

## 2021-04-16 LAB
BUN SERPL-MCNC: 22 MG/DL (ref 7–30)
CALCIUM SERPL-MCNC: 9.9 MG/DL (ref 8.5–10.4)
CHLORIDE SERPLBLD-SCNC: 103 MMOL/L (ref 94–109)
CO2 SERPL-SCNC: 23 MMOL/L (ref 20–32)
CREAT SERPL-MCNC: 1.3 MG/DL (ref 0.8–1.5)
EGFR CALCULATED (NON BLACK REFERENCE): 62.1 ML/MIN
GLUCOSE SERPL-MCNC: 354 MG/DL (ref 60–109)
POTASSIUM SERPL-SCNC: 3.8 MMOL/L (ref 3.4–5.3)
SODIUM SERPL-SCNC: 137 MMOL/L (ref 133–144)

## 2021-04-16 PROCEDURE — 36415 COLL VENOUS BLD VENIPUNCTURE: CPT | Performed by: STUDENT IN AN ORGANIZED HEALTH CARE EDUCATION/TRAINING PROGRAM

## 2021-04-16 PROCEDURE — 80048 BASIC METABOLIC PNL TOTAL CA: CPT | Performed by: STUDENT IN AN ORGANIZED HEALTH CARE EDUCATION/TRAINING PROGRAM

## 2021-04-16 PROCEDURE — 99213 OFFICE O/P EST LOW 20 MIN: CPT | Mod: GC | Performed by: STUDENT IN AN ORGANIZED HEALTH CARE EDUCATION/TRAINING PROGRAM

## 2021-04-16 ASSESSMENT — MIFFLIN-ST. JEOR: SCORE: 1399.24

## 2021-04-16 NOTE — NURSING NOTE
Due to patient being non-English speaking/uses sign language, an  was used for this visit. Only for face-to-face interpretation by an external agency, date and length of interpretation can be found on the scanned worksheet.     name: christiano  Agency: [employer]  Language: Hmong   Telephone number: [phone]  Type of interpretation: Telephone, spoken

## 2021-04-16 NOTE — PROGRESS NOTES
HPI:       Zheng Llanos is a 50 year old  male with PMH significant for hypertension and gout who presents for:      1. BP recheck.   - Feels well no concerns.  - Has not had any recurrence of gout symptoms.     A [employer]  was used for this visit         PMHX:     Patient Active Problem List   Diagnosis     Proteinuria     Gastroesophageal reflux disease, esophagitis presence not specified     Type 2 diabetes mellitus, without long-term current use of insulin (H)     Benign essential hypertension       Current Outpatient Medications   Medication Sig Dispense Refill     blood glucose (NO BRAND SPECIFIED) test strip Use to test blood sugar 1-2 times daily or as directed. 100 strip 11     blood glucose monitoring XX meter device kit Use to test blood sugar 1-2 times daily or as directed. 1 kit 1     blood glucose XX lancets standard Use to test blood sugar 1-2 times daily or as directed. 100 each 11     Contour Next EZ (CONTOUR NEXT EZ W/DEVICE KIT) w/Device KIT        empagliflozin (JARDIANCE) 10 MG TABS tablet Take 1 tablet (10 mg) by mouth daily 30 tablet 3     losartan (COZAAR) 100 MG tablet Take 1 tablet (100 mg) by mouth daily 30 tablet 1     losartan (COZAAR) 50 MG tablet Take 1 tablet (50 mg) by mouth daily 30 tablet 3     metFORMIN (GLUCOPHAGE) 1000 MG tablet Take 1 tablet (1,000 mg) by mouth 2 times daily (with meals) 60 tablet 11     metFORMIN 1000 MG PO tablet Take 1 tablet (1,000 mg) by mouth 2 times daily (with meals) 60 tablet 1     predniSONE (DELTASONE) 20 MG tablet Take 2 tablets (40 mg) by mouth daily (Patient not taking: Reported on 3/24/2021) 6 tablet 0       Social History: Reviewed    Family History: Reviewed       Allergies   Allergen Reactions     No Known Allergies        No results found for this or any previous visit (from the past 24 hour(s)).         Review of Systems:   10 point ROS negative except noted in above in HPI         Physical Exam:     Vitals:    04/16/21 1544   BP:  "121/84   Pulse: 85   Temp: 98  F (36.7  C)   TempSrc: Oral   SpO2: 95%   Weight: 64.4 kg (142 lb)   Height: 1.6 m (5' 3\")     Body mass index is 25.15 kg/m .    GENERAL APPEARANCE: healthy, alert and no distress,  RESP: lungs clear to auscultation - no rales, rhonchi or wheezes  CV: regular rate and rhythm,  and no murmur, click,  rub or gallop  MS: extremities normal- no gross deformities noted  SKIN: no suspicious lesions or rashes  NEURO: Normal strength and tone, sensory exam grossly normal, mentation appears intact and speech normal      Assessment and Plan     (I10) Benign essential hypertension  (primary encounter diagnosis)  Comment: BP appears well controlled on current regimen of losartan 100mg daily  Plan: Basic Metabolic Panel ([facility])  -Continue current losartan regimen daily  -Will notify of results of BMP  -Recommend follow up in 3-4 weeks.      Options for treatment and follow-up care were reviewed with the patient and/or guardian. Zheng Llanos and/or guardian engaged in the decision making process and verbalized understanding of the options discussed and agreed with the final plan.      Jaspreet Steiner DO [facility] Resident, PGY-1  Pager: [phone]    Precepted today with: Judith Card MD  "

## 2021-04-16 NOTE — PROGRESS NOTES
Preceptor Attestation:  Patient seen, examined, and discussed with the resident..  I agree with written assessment and plan of care.  Supervising Physician:  Ame Card MD  [facility]

## 2021-07-12 ENCOUNTER — OFFICE VISIT (OUTPATIENT)
Dept: PHARMACY | Facility: CLINIC | Age: 51
End: 2021-07-12
Payer: COMMERCIAL

## 2021-07-12 ENCOUNTER — OFFICE VISIT (OUTPATIENT)
Dept: FAMILY MEDICINE | Facility: CLINIC | Age: 51
End: 2021-07-12
Payer: COMMERCIAL

## 2021-07-12 VITALS
OXYGEN SATURATION: 98 % | TEMPERATURE: 97.3 F | DIASTOLIC BLOOD PRESSURE: 53 MMHG | SYSTOLIC BLOOD PRESSURE: 149 MMHG | BODY MASS INDEX: 24.98 KG/M2 | HEART RATE: 69 BPM | HEIGHT: 63 IN | WEIGHT: 141 LBS | RESPIRATION RATE: 16 BRPM

## 2021-07-12 DIAGNOSIS — Z23 HIGH PRIORITY FOR 2019-NCOV VACCINE: ICD-10-CM

## 2021-07-12 DIAGNOSIS — E11.29 TYPE 2 DIABETES MELLITUS WITH MICROALBUMINURIA, WITHOUT LONG-TERM CURRENT USE OF INSULIN (H): Primary | ICD-10-CM

## 2021-07-12 DIAGNOSIS — E11.9 TYPE 2 DIABETES MELLITUS WITHOUT COMPLICATION, WITHOUT LONG-TERM CURRENT USE OF INSULIN (H): Primary | ICD-10-CM

## 2021-07-12 DIAGNOSIS — E11.9 TYPE 2 DIABETES MELLITUS WITHOUT COMPLICATION, WITHOUT LONG-TERM CURRENT USE OF INSULIN (H): ICD-10-CM

## 2021-07-12 DIAGNOSIS — R80.9 TYPE 2 DIABETES MELLITUS WITH MICROALBUMINURIA, WITHOUT LONG-TERM CURRENT USE OF INSULIN (H): Primary | ICD-10-CM

## 2021-07-12 DIAGNOSIS — I10 BENIGN ESSENTIAL HYPERTENSION: ICD-10-CM

## 2021-07-12 LAB — HBA1C MFR BLD: 9.3 % (ref 0–5.6)

## 2021-07-12 PROCEDURE — 0011A COVID-19,PF,MODERNA: CPT | Performed by: STUDENT IN AN ORGANIZED HEALTH CARE EDUCATION/TRAINING PROGRAM

## 2021-07-12 PROCEDURE — 36415 COLL VENOUS BLD VENIPUNCTURE: CPT | Performed by: STUDENT IN AN ORGANIZED HEALTH CARE EDUCATION/TRAINING PROGRAM

## 2021-07-12 PROCEDURE — 83036 HEMOGLOBIN GLYCOSYLATED A1C: CPT | Performed by: STUDENT IN AN ORGANIZED HEALTH CARE EDUCATION/TRAINING PROGRAM

## 2021-07-12 PROCEDURE — 91301 COVID-19,PF,MODERNA: CPT | Performed by: STUDENT IN AN ORGANIZED HEALTH CARE EDUCATION/TRAINING PROGRAM

## 2021-07-12 PROCEDURE — 99214 OFFICE O/P EST MOD 30 MIN: CPT | Mod: GC | Performed by: STUDENT IN AN ORGANIZED HEALTH CARE EDUCATION/TRAINING PROGRAM

## 2021-07-12 PROCEDURE — 99207 PR NO CHARGE LOS: CPT | Performed by: PHARMACIST

## 2021-07-12 RX ORDER — LOSARTAN POTASSIUM 100 MG/1
100 TABLET ORAL DAILY
Qty: 90 TABLET | Refills: 3 | Status: SHIPPED | OUTPATIENT
Start: 2021-07-12 | End: 2021-08-16 | Stop reason: ALTCHOICE

## 2021-07-12 ASSESSMENT — MIFFLIN-ST. JEOR: SCORE: 1395.82

## 2021-07-12 NOTE — PROGRESS NOTES
ASSESSMENT:                            DM:  Uncontrolled A1c per ADA. The goal is less than 7%. His blood sugar is not at goal that probably due to stopped metformin previously.  Patient needs to restart his metformin. Patient stopped his first diabetes medication as his second medications started and would need to be educated on regimen and adherences.  Reported medications adherence inconsistent with refill history, concern that misses more doses/week.      HTN: Uncontrolled per JNC8 <140/90 mmHg. Based on refill history (#30 days last filled 4/4/21), concern that either not taking or taking very inconsistently. Consistent use ideal prior to adding additional agent.    PLAN:                            1. Continue to take your current medicines.   2. Start taking Metformin 1 tablet once per day. After one week, increase Metformin to 1 tablet in the morning and 1 tablet at night.     See Patient Instructions for co-developed, patient-stated behavior change goals.     Follow-up: 2 weeks    SUBJECTIVE/OBJECTIVE:                          Zheng Llanos is a 51 year old male coming in for an initial visit. He was referred to me from Dr. Luis. Today's visit is a co-visit with Dr. Luis.  was present during today's visit.     DM: When received second diabetes medicine, stopped the first one. Miscommunication on our part. Continues on single medicine for diabetes. Anticipate based on refill data that this is Jardiance 10 mg (filled 3/12/21 #30 days). Patient reports that he misses 1-2 doses a week.    Lab Results   Component Value Date    A1C 9.3 07/12/2021    A1C 10.7 02/17/2021    A1C 9.9 09/29/2020    A1C 13.5 02/20/2020     The 10-year ASCVD risk score (Nadegeroma RODRIGUEZ Jr., et al., 2013) is: 17.8%    Values used to calculate the score:      Age: 51 years      Sex: Male      Is Non- : No      Diabetic: Yes      Tobacco smoker: No      Systolic Blood Pressure: 149 mmHg      Is BP treated: Yes       "HDL Cholesterol: 47 mg/dL      Total Cholesterol: 279 mg/dL     HTN: 1 tablet of Losartan daily. Usually takes in the morning and the diabetes pill at night. However no bottles with us today in clinic, will confirm at next visit, will ask to bring in medicine bottles.     Today's Vitals:   BP Readings from Last 1 Encounters:   07/12/21 (!) 149/53     Pulse Readings from Last 1 Encounters:   07/12/21 69     Wt Readings from Last 1 Encounters:   07/12/21 141 lb (64 kg)     Ht Readings from Last 1 Encounters:   07/12/21 5' 3.39\" (1.61 m)     Estimated body mass index is 24.67 kg/m  as calculated from the following:    Height as of an earlier encounter on 7/12/21: 5' 3.39\" (1.61 m).    Weight as of an earlier encounter on 7/12/21: 141 lb (64 kg).    Temp Readings from Last 1 Encounters:   07/12/21 97.3  F (36.3  C) (Oral)     ----------------    I spent 20 minutes with this patient today. Any diabetes medication dose changes were made via the CDE Protocol and Collaborative Practice Agreement. A copy of the visit note was provided to the patient's primary care provider.    The patient was given a summary of these recommendations. See Provider note/AVS from today.     Patient seen today by Carlos Kincaid, PD4 student    Chen Sheppard, PharmD, [facility] (previously, CDE)  [facility]  Phone: [phone]  July 12, 2021 at 4:01 PM  "

## 2021-07-12 NOTE — PATIENT INSTRUCTIONS
1. Continue to take your current medicines.   2. Start taking Metformin 1 tablet once per day. After one week, increase Metformin to 1 tablet in the morning and 1 tablet at night.

## 2021-07-12 NOTE — PROGRESS NOTES
Faculty Supervision of Residents   I have examined this patient and the medical care has been evaluated and discussed with the resident. See resident note to follow outlining our discussion.    DOS 7/12/2021    Ame Card MD

## 2021-07-12 NOTE — PROGRESS NOTES
Assessment and Plan     DM2:  There was some confusion about medications and ended up stopping his metformin. A1C 9.3 today which is improvement from 10.7 previously. Will restart metformin, and continue jardiance.   - Start Metformin 500mg every day, then BID after 1 week  - Continue Jardiance 10mg every day  - Follow-up 3 months    HTN:  No issues with medications when he takes them. BP slightly elevated today at 149/53. Before moving was checking BPs frequently and states were better than today. Question compliance with meds, so will resend to pharmacy and follow-up in 1 month.   - Refilled Losartan 100mg every day   - Follow-up 1 month    Health Maintenance:  - COVID19 vaccine today     Options for treatment and follow-up care were reviewed with the patient and/or guardian. Zheng Llanos and/or guardian engaged in the decision making process and verbalized understanding of the options discussed and agreed with the final plan.    Sascha Luis DO, NINA  [facility], PGY-3    Precepted patient with Dr. Ame Card       HPI:   Zheng Llanos is a 51 year old male who presents to clinic today for   Chief Complaint   Patient presents with     Follow Up     DM      Medication Reconciliation     DM Follow-up:  - Only taking one of his Dm2 meds, as he thought one wasn't working so he stopped taking it (metformin?)  - No LH or dizziness, numbness/tingling,   - Is moving so hasn't been checking BG at home    HTN:  - Was checking BP with home cuff until started moving and too hectic   - States BPs were better than what it was here today.  - No CP, LE edema,     Wants COVID vaccine today    A [employer]  was used for this visit    Denies Fever, Chest Pain, shortness of breath , changes in vision/hearing/GI//diet, abdominal pain, numbness/tingling, or any other concerns.         PMHX:   Active Problems List  Patient Active Problem List   Diagnosis      "Proteinuria     Gastroesophageal reflux disease, esophagitis presence not specified     Type 2 diabetes mellitus, without long-term current use of insulin (H)     Benign essential hypertension       Current Medications  Current Outpatient Medications   Medication Sig Dispense Refill     blood glucose (NO BRAND SPECIFIED) test strip Use to test blood sugar 1-2 times daily or as directed. 100 strip 11     blood glucose monitoring XX meter device kit Use to test blood sugar 1-2 times daily or as directed. 1 kit 1     blood glucose XX lancets standard Use to test blood sugar 1-2 times daily or as directed. 100 each 11     Contour Next EZ (CONTOUR NEXT EZ W/DEVICE KIT) w/Device KIT        empagliflozin (JARDIANCE) 10 MG TABS tablet Take 1 tablet (10 mg) by mouth daily 30 tablet 3     losartan (COZAAR) 100 MG tablet Take 1 tablet (100 mg) by mouth daily 30 tablet 1     metFORMIN (GLUCOPHAGE) 1000 MG tablet Take 1 tablet (1,000 mg) by mouth 2 times daily (with meals) 60 tablet 11       Social History  Social History     Tobacco Use     Smoking status: Never Smoker     Smokeless tobacco: Never Used   Substance Use Topics     Alcohol use: No     Drug use: No     History   Drug Use No       Family History  Family History   Problem Relation Age of Onset     Heart Disease No family hx of      Diabetes No family hx of      Cancer No family hx of        Allergies  Allergies   Allergen Reactions     No Known Allergies             Physical Exam:     Vitals:    07/12/21 1459 07/12/21 1500   BP: (!) 165/100 (!) 149/53   BP Location: Right arm Right arm   Patient Position: Sitting Sitting   Cuff Size: Adult Regular Adult Regular   Pulse: 69    Resp: 16    Temp: 97.3  F (36.3  C)    TempSrc: Oral    SpO2: 98%    Weight: 64 kg (141 lb)    Height: 1.61 m (5' 3.39\")      Body mass index is 24.67 kg/m .    GENERAL APPEARANCE: alert, appears stated age, no acute distress  HEENT: Eyes grossly normal to inspection, nares normal, and mouth and " throat without erythema, ulcers, or lesions  RESP: lungs clear to auscultation - no rales, rhonchi, or wheezes  CV: regular rate and rhythm, no murmur, click, rub, or gallop  MSK: extremities normal, no gross deformities noted, no lower extremity edema  SKIN: no suspicious lesions or rashes   NEURO: mentation appears intact and speech normal  PSYCH: mood and affect normal/bright

## 2021-07-12 NOTE — NURSING NOTE
Due to patient being non-English speaking/uses sign language, an  was used for this visit. Only for face-to-face interpretation by an external agency, date and length of interpretation can be found on the scanned worksheet.     name: Ada Garcia   Agency: [employer]  Language: [place]   Telephone number: [phone]  Type of interpretation: Telephone, spoken

## 2021-08-09 ENCOUNTER — IMMUNIZATION (OUTPATIENT)
Dept: FAMILY MEDICINE | Facility: CLINIC | Age: 51
End: 2021-08-09
Attending: STUDENT IN AN ORGANIZED HEALTH CARE EDUCATION/TRAINING PROGRAM
Payer: COMMERCIAL

## 2021-08-09 PROCEDURE — 0012A PR COVID VAC MODERNA 100 MCG/0.5 ML IM: CPT

## 2021-08-09 PROCEDURE — 91301 PR COVID VAC MODERNA 100 MCG/0.5 ML IM: CPT

## 2021-08-16 ENCOUNTER — OFFICE VISIT (OUTPATIENT)
Dept: EDUCATION SERVICES | Facility: CLINIC | Age: 51
End: 2021-08-16
Payer: COMMERCIAL

## 2021-08-16 VITALS
DIASTOLIC BLOOD PRESSURE: 97 MMHG | BODY MASS INDEX: 24.85 KG/M2 | SYSTOLIC BLOOD PRESSURE: 146 MMHG | WEIGHT: 142 LBS | HEART RATE: 58 BPM

## 2021-08-16 DIAGNOSIS — I10 BENIGN ESSENTIAL HYPERTENSION: Primary | ICD-10-CM

## 2021-08-16 DIAGNOSIS — E11.9 TYPE 2 DIABETES MELLITUS WITHOUT COMPLICATION, WITHOUT LONG-TERM CURRENT USE OF INSULIN (H): ICD-10-CM

## 2021-08-16 PROCEDURE — 99207 PR NO CHARGE LOS: CPT | Performed by: PHARMACIST

## 2021-08-16 RX ORDER — LOSARTAN POTASSIUM AND HYDROCHLOROTHIAZIDE 12.5; 1 MG/1; MG/1
1 TABLET ORAL DAILY
Qty: 90 TABLET | Refills: 1 | Status: SHIPPED | OUTPATIENT
Start: 2021-08-16 | End: 2022-02-16

## 2021-08-16 RX ORDER — METFORMIN HCL 500 MG
1000 TABLET, EXTENDED RELEASE 24 HR ORAL DAILY
Qty: 180 TABLET | Refills: 3 | Status: SHIPPED | OUTPATIENT
Start: 2021-08-16 | End: 2022-02-16

## 2021-08-16 NOTE — PATIENT INSTRUCTIONS
Recommendations from today's visit:                                                      1. Start the new blood pressure pill (Losartan-hydrochlorothiazide).   2. Once you start the new blood pressure pill, stop the old blood pressure pill (Losartan)  3. Start taking your Jardiance with the meal you eat before you go to work  4. Increase your Metformin to 2 tablets once per day before you go to work     It was great to speak with you today!    I value your experience. You may receive a survey via email or text message in the next few days. I would be very thankful for your time in providing feedback.    Pharmacist's contact information:                                                      Please feel free to contact me with any questions or concerns you have.     Chen Sheppard  Pharmacist, Certified Diabetes Care and   [facility]  Phone: [phone]  August 16, 2021 at 2:40 PM

## 2021-09-22 ENCOUNTER — OFFICE VISIT (OUTPATIENT)
Dept: FAMILY MEDICINE | Facility: CLINIC | Age: 51
End: 2021-09-22
Payer: COMMERCIAL

## 2021-09-22 VITALS
HEART RATE: 75 BPM | WEIGHT: 141 LBS | SYSTOLIC BLOOD PRESSURE: 123 MMHG | DIASTOLIC BLOOD PRESSURE: 88 MMHG | OXYGEN SATURATION: 96 % | HEIGHT: 63 IN | RESPIRATION RATE: 22 BRPM | BODY MASS INDEX: 24.98 KG/M2 | TEMPERATURE: 98 F

## 2021-09-22 DIAGNOSIS — Z23 NEED FOR PROPHYLACTIC VACCINATION AND INOCULATION AGAINST INFLUENZA: ICD-10-CM

## 2021-09-22 DIAGNOSIS — B08.1 MOLLUSCUM CONTAGIOSUM: Primary | ICD-10-CM

## 2021-09-22 PROCEDURE — T1013 SIGN LANG/ORAL INTERPRETER: HCPCS | Performed by: STUDENT IN AN ORGANIZED HEALTH CARE EDUCATION/TRAINING PROGRAM

## 2021-09-22 PROCEDURE — 90682 RIV4 VACC RECOMBINANT DNA IM: CPT | Performed by: STUDENT IN AN ORGANIZED HEALTH CARE EDUCATION/TRAINING PROGRAM

## 2021-09-22 PROCEDURE — 90471 IMMUNIZATION ADMIN: CPT | Performed by: STUDENT IN AN ORGANIZED HEALTH CARE EDUCATION/TRAINING PROGRAM

## 2021-09-22 PROCEDURE — 99213 OFFICE O/P EST LOW 20 MIN: CPT | Mod: 25 | Performed by: STUDENT IN AN ORGANIZED HEALTH CARE EDUCATION/TRAINING PROGRAM

## 2021-09-22 RX ORDER — CETIRIZINE HYDROCHLORIDE 10 MG/1
10 TABLET ORAL DAILY
Qty: 30 TABLET | Refills: 0 | Status: SHIPPED | OUTPATIENT
Start: 2021-09-22 | End: 2023-07-21

## 2021-09-22 RX ORDER — PODOFILOX 5 MG/ML
SOLUTION TOPICAL
Qty: 20 ML | Refills: 0 | Status: SHIPPED | OUTPATIENT
Start: 2021-09-22 | End: 2023-07-21

## 2021-09-22 ASSESSMENT — MIFFLIN-ST. JEOR: SCORE: 1392.7

## 2021-09-22 NOTE — PROGRESS NOTES
"1. Molluscum contagiosum-discussed option of treating vs letting disease run it's course.  Patient has opted for treatment.  Will send some zyrtec for itch and treat topically with podofilox.  Patient will return in 4 weeks to check on rash and is due for A1c at that time. He will come in sooner if rash changes or becomes more painful  - podofilox (CONDYLOX) 0.5 % external solution; Apply morning and night every other day to area of rash for up to 4 weeks  Dispense: 20 mL; Refill: 0        Subjective   Zheng is a 51 year old who presents for the following health issuesPatient presents today of follow up a rash.  Rash developed Saturday (5 days ago)  Involves arms, legs, torso.  Bumps that are itchy  No fevers sweats or chills  No known sick contacts  Otherwise feels well              Review of Systems         Objective    /88   Pulse 75   Temp 98  F (36.7  C)   Resp 22   Ht 1.605 m (5' 3.19\")   Wt 64 kg (141 lb)   SpO2 96%   BMI 24.83 kg/m    Body mass index is 24.83 kg/m .  Physical Exam   GEN: NAD  HEENT: head atraumatic, normocephalic, moist mucous membranes, eyes anicteric  CV: RRR w/o M/R/G  PULM: CTAB  ABD: soft, non-tender, no appreciable masses, no guarding, BS present  Neuro: alert and oriented x 3, CN II-XII intact, normal gross motor movements  Psych: appropriate  Ext: no peripheral edema  SKIN:  Diffuse rash on arms, torso, legs.  Some areas excoriated and erythematous.  No evidence secondary infection.  On arms raised pearly papules with central dimpling.     \plain        "

## 2021-09-22 NOTE — NURSING NOTE
Due to patient being non-English speaking/uses sign language, an  was used for this visit. Only for face-to-face interpretation by an external agency, date and length of interpretation can be found on the scanned worksheet.     name: Emir Garcia  Agency: [employer]  Language: [place]   Telephone number: [phone]  Type of interpretation: Face-to-face, spoken

## 2021-09-22 NOTE — LETTER
RETURN TO WORK/SCHOOL FORM    9/22/2021    Re: Zhegn Llanos  1970      To Whom It May Concern:     Zheng Llanos was seen in clinic today..  Doctor evaluated his rash and safe to return to work without restrictions.          Restrictions:  None      Ame Card MD  9/22/2021 8:31 AM

## 2021-10-13 ENCOUNTER — TELEPHONE (OUTPATIENT)
Dept: FAMILY MEDICINE | Facility: CLINIC | Age: 51
End: 2021-10-13

## 2021-10-13 NOTE — TELEPHONE ENCOUNTER
Called patient but either spouse or son answer and informed patient is at work, asked to see if patient can give us a call back to schedule an appointment, per mira interiano missed appointment with her. If patient calls back   Thank you    _jd

## 2022-02-15 ENCOUNTER — OFFICE VISIT (OUTPATIENT)
Dept: FAMILY MEDICINE | Facility: CLINIC | Age: 52
End: 2022-02-15
Payer: COMMERCIAL

## 2022-02-15 ENCOUNTER — TELEPHONE (OUTPATIENT)
Dept: FAMILY MEDICINE | Facility: CLINIC | Age: 52
End: 2022-02-15

## 2022-02-15 VITALS
RESPIRATION RATE: 18 BRPM | SYSTOLIC BLOOD PRESSURE: 136 MMHG | HEART RATE: 83 BPM | BODY MASS INDEX: 25.34 KG/M2 | TEMPERATURE: 98 F | OXYGEN SATURATION: 95 % | HEIGHT: 63 IN | DIASTOLIC BLOOD PRESSURE: 87 MMHG | WEIGHT: 143 LBS

## 2022-02-15 DIAGNOSIS — E11.9 TYPE 2 DIABETES MELLITUS WITHOUT COMPLICATION, WITHOUT LONG-TERM CURRENT USE OF INSULIN (H): ICD-10-CM

## 2022-02-15 DIAGNOSIS — I10 BENIGN ESSENTIAL HYPERTENSION: ICD-10-CM

## 2022-02-15 DIAGNOSIS — E11.9 TYPE 2 DIABETES MELLITUS WITHOUT COMPLICATION, WITHOUT LONG-TERM CURRENT USE OF INSULIN (H): Primary | ICD-10-CM

## 2022-02-15 DIAGNOSIS — Z23 HIGH PRIORITY FOR 2019-NCOV VACCINE: ICD-10-CM

## 2022-02-15 DIAGNOSIS — M10.9 ACUTE GOUT OF RIGHT ANKLE, UNSPECIFIED CAUSE: Primary | ICD-10-CM

## 2022-02-15 LAB
ANION GAP SERPL CALCULATED.3IONS-SCNC: 13 MMOL/L (ref 5–18)
BUN SERPL-MCNC: 21 MG/DL (ref 8–22)
CALCIUM SERPL-MCNC: 9.1 MG/DL (ref 8.5–10.5)
CHLORIDE BLD-SCNC: 102 MMOL/L (ref 98–107)
CO2 SERPL-SCNC: 18 MMOL/L (ref 22–31)
CREAT SERPL-MCNC: 1.78 MG/DL (ref 0.7–1.3)
GFR SERPL CREATININE-BSD FRML MDRD: 46 ML/MIN/1.73M2
GLUCOSE BLD-MCNC: 524 MG/DL (ref 70–125)
HBA1C MFR BLD: 11.8 % (ref 0–5.6)
POTASSIUM BLD-SCNC: 4.4 MMOL/L (ref 3.5–5)
SODIUM SERPL-SCNC: 133 MMOL/L (ref 136–145)
URATE SERPL-MCNC: 7.2 MG/DL (ref 3–8)

## 2022-02-15 PROCEDURE — 80048 BASIC METABOLIC PNL TOTAL CA: CPT | Performed by: STUDENT IN AN ORGANIZED HEALTH CARE EDUCATION/TRAINING PROGRAM

## 2022-02-15 PROCEDURE — 91306 COVID-19,PF,MODERNA (18+ YRS BOOSTER .25ML): CPT | Performed by: STUDENT IN AN ORGANIZED HEALTH CARE EDUCATION/TRAINING PROGRAM

## 2022-02-15 PROCEDURE — 0064A COVID-19,PF,MODERNA (18+ YRS BOOSTER .25ML): CPT | Performed by: STUDENT IN AN ORGANIZED HEALTH CARE EDUCATION/TRAINING PROGRAM

## 2022-02-15 PROCEDURE — 36415 COLL VENOUS BLD VENIPUNCTURE: CPT | Performed by: STUDENT IN AN ORGANIZED HEALTH CARE EDUCATION/TRAINING PROGRAM

## 2022-02-15 PROCEDURE — 84550 ASSAY OF BLOOD/URIC ACID: CPT | Performed by: STUDENT IN AN ORGANIZED HEALTH CARE EDUCATION/TRAINING PROGRAM

## 2022-02-15 PROCEDURE — 80061 LIPID PANEL: CPT | Performed by: STUDENT IN AN ORGANIZED HEALTH CARE EDUCATION/TRAINING PROGRAM

## 2022-02-15 PROCEDURE — 99214 OFFICE O/P EST MOD 30 MIN: CPT | Mod: GC | Performed by: STUDENT IN AN ORGANIZED HEALTH CARE EDUCATION/TRAINING PROGRAM

## 2022-02-15 PROCEDURE — 83036 HEMOGLOBIN GLYCOSYLATED A1C: CPT | Performed by: STUDENT IN AN ORGANIZED HEALTH CARE EDUCATION/TRAINING PROGRAM

## 2022-02-15 RX ORDER — NAPROXEN 500 MG/1
500 TABLET ORAL 2 TIMES DAILY WITH MEALS
Qty: 60 TABLET | Refills: 0 | Status: SHIPPED | OUTPATIENT
Start: 2022-02-15 | End: 2023-07-21

## 2022-02-15 ASSESSMENT — MIFFLIN-ST. JEOR: SCORE: 1398.64

## 2022-02-15 NOTE — LETTER
RETURN TO WORK/SCHOOL FORM    2/15/2022    Re: Zheng Llanos  1970      To Whom It May Concern:     Zheng Llanos was seen in clinic today. He is not to go to work until after I see him on Monday, 2/21/22, when I re-evaluate him.     Mateus Luis MD  2/15/2022 11:27 AM

## 2022-02-15 NOTE — PROGRESS NOTES
Assessment and Plan     Right Foot Pain:  Began at work while standing with some swelling on lateral side of foot to mid shin. No trauma, or acute mechanism. Literally just standing and acutely worsened? Continues to be painful when walking. On exam, Minimal (if any) forefoot swelling, mild erythema about 2cm superior to 5th metatarsal base, 5th metastarsal base ttp mildly, plantar aspect of heel ttp. Peripheral pulses intact. Reviewed previous notes. Does have hx of gout, but does stand for many hours at work, and was unable to finish working the other day. Work shoes >2 years old. Has some similar pain in left foot, but no swelling, redness, etc. Plantar fascitis vs gout. don't believe this to be infectious or vascular. If DM2 uncontrolled, may be neuropathy related.  - Naproxen, ice, good shoes/arches  - Uric Acid  - Work note  - Follow-up 1 week    DM2:  Hasn't had follow up for this for ~5 months. Last A1C 9.3 on 7/12/21. States he's taking his meds, 2 of them although doesn't know names for this but 3 pills total, each day. Hasn't checked BG in awhile.   - A1C  - Continue Metformin, jardiance   - Follow-up 2-4 weeks pending A1C    HTN:  BP controlled at office visit today.   -Continue Lisinopril/hctz for now.  - consider stopping hydrochlorothiazide if elevated UA, and switching to amlodipine     COVID19 Booster Shot      Options for treatment and follow-up care were reviewed with the patient and/or guardian. Zheng Llanos and/or guardian engaged in the decision making process and verbalized understanding of the options discussed and agreed with the final plan.    Sascha Luis DO, NINA  [facility], PGY-3    Precepted patient with Dr. Tiffany Vargas       HPI:   Zheng Llanos is a 51 year old male who presents to clinic today for   Chief Complaint   Patient presents with     Musculoskeletal Problem     onset 2/14/21 12pm at work outside of right  "foot started hurting. Noticed swelling and pain throughout the enitre area spreading through calf. Today lots of swelling is gone but still painful and describes calf pain as \"tight\". Denies any specific injury or trauma      Medication Reconciliation     Right Leg Pain:  - Started yesterday  - was at work, around noon, when started. Standing and packaging at work. Nothing to strenuous. No trauma.   - Swelling and pain yesterday.   - rested and raised it. No ice, ibuprofen, etc.  - pain with pressure  - today better than yesterday, swelling improving, pain improving      A [employer]  was used for this visit    Denies Fever, Chest Pain, shortness of breath , changes in vision/hearing/GI//diet, abdominal pain, numbness/tingling, or any other concerns.         PMHX:   Active Problems List  Patient Active Problem List   Diagnosis     Proteinuria     Gastroesophageal reflux disease, esophagitis presence not specified     Type 2 diabetes mellitus, without long-term current use of insulin (H)     Benign essential hypertension       Current Medications  Current Outpatient Medications   Medication Sig Dispense Refill     blood glucose (NO BRAND SPECIFIED) lancets standard Use to test blood sugar 1 times daily 100 each 3     blood glucose (NO BRAND SPECIFIED) test strip Use to test blood sugar 1 times daily= 100 strip 3     cetirizine (ZYRTEC) 10 MG tablet Take 1 tablet (10 mg) by mouth daily 30 tablet 0     empagliflozin (JARDIANCE) 10 MG TABS tablet Take 1 tablet (10 mg) by mouth daily 90 tablet 1     losartan-hydrochlorothiazide (HYZAAR) 100-12.5 MG tablet Take 1 tablet by mouth daily 90 tablet 1     metFORMIN (GLUCOPHAGE-XR) 500 MG 24 hr tablet Take 2 tablets (1,000 mg) by mouth daily 180 tablet 3     podofilox (CONDYLOX) 0.5 % external solution Apply morning and night every other day to area of rash for up to 4 weeks 20 mL 0       Social History  Social History     Tobacco Use     Smoking status: Never Smoker " "    Smokeless tobacco: Never Used   Substance Use Topics     Alcohol use: No     Drug use: No     History   Drug Use No       Family History  Family History   Problem Relation Age of Onset     Heart Disease No family hx of      Diabetes No family hx of      Cancer No family hx of        Allergies  Allergies   Allergen Reactions     No Known Allergies             Physical Exam:     Vitals:    02/15/22 1109   BP: 136/87   Pulse: 83   Resp: 18   Temp: 98  F (36.7  C)   TempSrc: Oral   SpO2: 95%   Weight: 64.9 kg (143 lb)   Height: 1.6 m (5' 2.99\")     Body mass index is 25.34 kg/m .    GENERAL APPEARANCE: alert, appears stated age, no acute distress  HEENT: Eyes grossly normal to inspection,  RESP: lungs clear to auscultation - no rales, rhonchi, or wheezes  CV: regular rate and rhythm, no murmur, click, rub, or gallop  MSK: Minimal (if any) forefoot swelling, mild erythema about 2cm superior to 5th metatarsal base, 5th metastarsal base ttp mildly, plantar aspect of heel ttp.  NEURO: Normal strength and tone, sensory exam grossly normal, mentation appears intact and speech normal  PSYCH: mood and affect normal/bright       "

## 2022-02-15 NOTE — NURSING NOTE
Due to patient being non-English speaking/uses sign language, an  was used for this visit. Only for face-to-face interpretation by an external agency, date and length of interpretation can be found on the scanned worksheet.     name: Gene   Agency: [employer]  Language: [place]   Telephone number: [phone]  Type of interpretation: Telephone, spoken

## 2022-02-15 NOTE — PROGRESS NOTES
Preceptor Attestation:   Patient seen, evaluated and discussed with the resident. I have verified the content of the note, which accurately reflects my assessment of the patient and the plan of care.  Supervising Physician:Tiffany Vargas MD  [facility]

## 2022-02-16 RX ORDER — LOSARTAN POTASSIUM AND HYDROCHLOROTHIAZIDE 12.5; 1 MG/1; MG/1
1 TABLET ORAL DAILY
Qty: 90 TABLET | Refills: 1 | Status: SHIPPED | OUTPATIENT
Start: 2022-02-16 | End: 2023-07-21

## 2022-02-16 RX ORDER — METFORMIN HCL 500 MG
1000 TABLET, EXTENDED RELEASE 24 HR ORAL DAILY
Qty: 180 TABLET | Refills: 3 | Status: SHIPPED | OUTPATIENT
Start: 2022-02-16 | End: 2023-07-06

## 2022-02-16 NOTE — TELEPHONE ENCOUNTER
A [employer]  was used for this call. Called patient and left VM to call back. Patient needs to be seen today to discuss blood glucose and insulin adjustment. Writer scheduled patient for 3:40pm with . Please verify this time will work for patient or switch to another time for today. If patient has further questions, please notify this writer. Yaima MOTLEY

## 2022-02-16 NOTE — TELEPHONE ENCOUNTER
Paged about critical results with a blood sugar of 524 and a hemoglobin A1c of 11.8.  Reached out to patient with a phone  to discuss the results of his blood work today.  At this time he feels relatively close to his baseline.  He has likely had elevated sugars for a long time now and this is unlikely to be acute.  Discussed that he needed to be started on insulin with sugars that high and that he should be seen in clinic tomorrow to help with this.  We sent a prescription for long acting insulin and instructed the patient to pick it up and bring it into clinic tomorrow.  Patient is feeling nervous about this and is hesitant to start insulin.  Stressed the importance of following up in clinic and the likelihood that oral medication alone would not be able to get him under control.  Patient is not sure if he will agree to start insulin, but understands we need to see him back in clinic to help with this.  Calculated based on his weight he should start on ~14 U of long acting insulin and this script was sent.  Will reach out to nursing staff to reach out to the patient tomorrow to help get follow up arranged.  Discussed reasons to return or present to the emergency department which patient was able to verbalize.  Encouraged him to check his sugars at home to make sure that it is not becoming more elevated.      Lyndon Gee MD on 2/15/2022 at 9:19 PM

## 2022-02-17 ENCOUNTER — OFFICE VISIT (OUTPATIENT)
Dept: FAMILY MEDICINE | Facility: CLINIC | Age: 52
End: 2022-02-17
Payer: COMMERCIAL

## 2022-02-17 VITALS
RESPIRATION RATE: 20 BRPM | WEIGHT: 139 LBS | HEART RATE: 58 BPM | OXYGEN SATURATION: 99 % | DIASTOLIC BLOOD PRESSURE: 94 MMHG | BODY MASS INDEX: 25.58 KG/M2 | TEMPERATURE: 97.7 F | SYSTOLIC BLOOD PRESSURE: 155 MMHG | HEIGHT: 62 IN

## 2022-02-17 DIAGNOSIS — E11.9 TYPE 2 DIABETES MELLITUS WITHOUT COMPLICATION, WITHOUT LONG-TERM CURRENT USE OF INSULIN (H): Primary | ICD-10-CM

## 2022-02-17 DIAGNOSIS — I10 BENIGN ESSENTIAL HYPERTENSION: ICD-10-CM

## 2022-02-17 PROBLEM — K21.9 GASTROESOPHAGEAL REFLUX DISEASE: Status: ACTIVE | Noted: 2017-04-28

## 2022-02-17 LAB
CHOLEST SERPL-MCNC: 249 MG/DL
CREAT UR-MCNC: 271 MG/DL
HDLC SERPL-MCNC: 45 MG/DL
LDLC SERPL CALC-MCNC: ABNORMAL MG/DL
MICROALBUMIN UR-MCNC: 94.57 MG/DL (ref 0–1.99)
MICROALBUMIN/CREAT UR: 349 MG/G CR
TRIGL SERPL-MCNC: 451 MG/DL

## 2022-02-17 PROCEDURE — 99214 OFFICE O/P EST MOD 30 MIN: CPT | Mod: GC | Performed by: STUDENT IN AN ORGANIZED HEALTH CARE EDUCATION/TRAINING PROGRAM

## 2022-02-17 PROCEDURE — 82043 UR ALBUMIN QUANTITATIVE: CPT | Performed by: STUDENT IN AN ORGANIZED HEALTH CARE EDUCATION/TRAINING PROGRAM

## 2022-02-17 RX ORDER — LANCETS 30 GAUGE
1 EACH MISCELLANEOUS DAILY
COMMUNITY
Start: 2021-08-16

## 2022-02-17 NOTE — LETTER
February 22, 2022      Zheng Llanos  [address] [place] [address]        Dear ,    We are writing to inform you of your test results.    Your recent lab test shows that your kidneys are spilling some proteins into your urine, this means that down the road you could develop kidney disease due to your diabetes. We may need to add additional medications, but the best way to prevent kidney disease for you is to treat your diabetes, which we are doing with the insulin that we discussed at your last visit.     Please reach out with any questions, or we can discuss this further in follow up.        Resulted Orders   Albumin Random Urine Quantitative with Creat Ratio   Result Value Ref Range    Microalbumin Urine mg/dL 94.57 (H) 0.00 - 1.99 mg/dL    Creatinine Urine mg/dL 271 mg/dL    Microalbumin Urine mg/g Cr 349.0 (H) <=19.9 mg/g Cr    Narrative    Microalbumin, Random Urine   <2.0 mg/dL . . . . . . . . Normal   3.0-30.0 mg/dL . . . . . . Microalbuminuria   >30.0 mg/dL . . . . . .  . Clinical Proteinuria     Microalbumin/Creatinine Ratio, Random Urine   <20 mg/g . . . . .. . . . Normal    mg/g . . . . . . . Microalbuminuria   >300 mg/g . . . . . . . . Clinical Proteinuria       If you have any questions or concerns, please call the clinic at the number listed above.       Sincerely,      Rose Ritter MD

## 2022-02-17 NOTE — NURSING NOTE
Verbal, written information, education and demonstration on how to administer Lantus Solostar pen insulin and storage of unused pens completed with patient and family member that was present during office visit today. No further questions/ concerns voiced during teaching. Will plan to follow up in clinic in two weeks. Kathleen MOTLEY

## 2022-02-17 NOTE — PROGRESS NOTES
Assessment & Plan     Type 2 diabetes mellitus without complication, without long-term current use of insulin (H)  Due for lipids and micro albumin, lipids pending.  Microalbumin elevated, on losartan.  Blood pressure is elevated 155/94 today, question medication nonadherence, will continue to follow and follow-up.  Significantly elevated blood sugars as well as A1c of 11.8 on 2/15.  Discussed need for starting Lantus insulin daily.  Education given with RN.  Patient understanding.  Plans to check blood sugars fasting when he awakes prior to his work evening shift.  He will keep a log and bring this to the next appointment.  Plan to start 14 units of Lantus, will take this after taking his blood sugars prior to eating his breakfast.  This plan is complicated by the fact that still works overnights, plans to take sugars and Lantus around noon when he awakes as this is likely when he is fasting.  He understands he should eat with his insulin.  Discussed warning signs of hypoglycemia, will plan to take medications should he feel lightheaded, dizzy, or generally ill.  Understands that he will need to drink sugary beverages juices juice or pop if his blood sugar was less than 80.  Plans to transition from sugar pop to sugar-free beverages.  Understands that this could be significant change and make correct A1c drastically.  Understands that if we can work on diet and exercise he may be able to discontinue insulin in the future.  - Lipid Profile   - Albumin Random Urine Quantitative with Creat Ratio  - Albumin Random Urine Quantitative with Creat Ratio    Close follow up with PCP and Pharm D in 2-3 weeks.    Precepted with Dr. Ritter.    Nancy Obrien MD  [facility] Subjective Soua is a 51 year old who presents for the following health issues  accompanied by his son.    HPI     T2DM  A1c 11.8, previously 9.3 drawn 7 months ago  Dr. Gee prescribed insulin on 2/15  Has not yet  "picked up the insulin, is very resistant to using needles.  Has not been checking his blood sugars as he does not like poking his fingers  Accompanied with his son who is very concerned about his diabetes.    Reports he has been drinking 4 cans of pepsi per work day.  Thinks he could transition to diet pop.  Does have history of elevated A1c's in the 11 or greater range.  Currently is working nights which does make checking blood sugars and taking insulin difficult.    Denies any lightheadedness, dizziness, chest pain, shortness of breath.  No wounds on feet.  No concerns of neuropathy.  Patient is tolerating p.o. intake without difficulty.  No dysuria.    Review of Systems   Constitutional, HEENT, cardiovascular, pulmonary, GI, , musculoskeletal, neuro, skin, endocrine and psych systems are negative, except as otherwise noted.      Objective    BP (!) 155/94   Pulse 58   Temp 97.7  F (36.5  C)   Resp 20   Ht 1.575 m (5' 2\")   Wt 63 kg (139 lb)   SpO2 99%   BMI 25.42 kg/m    Body mass index is 25.42 kg/m .  Physical Exam   GENERAL: healthy, alert and no distress  EYES: Eyes grossly normal to inspection, PERRL and conjunctivae and sclerae normal  NECK: no adenopathy, no asymmetry, masses, or scars and thyroid normal to palpation  RESP: lungs clear to auscultation - no rales, rhonchi or wheezes  CV: regular rate and rhythm, normal S1 S2, no S3 or S4, no murmur, click or rub, no peripheral edema and peripheral pulses strong  MS: no gross musculoskeletal defects noted, no edema  PSYCH: mentation appears normal, affect normal/bright    Results for orders placed or performed in visit on 02/17/22   Albumin Random Urine Quantitative with Creat Ratio     Status: Abnormal   Result Value Ref Range    Microalbumin Urine mg/dL 94.57 (H) 0.00 - 1.99 mg/dL    Creatinine Urine mg/dL 271 mg/dL    Microalbumin Urine mg/g Cr 349.0 (H) <=19.9 mg/g Cr    Narrative    Microalbumin, Random Urine   <2.0 mg/dL . . . . . . . . " Normal   3.0-30.0 mg/dL . . . . . . Microalbuminuria   >30.0 mg/dL . . . . . .  . Clinical Proteinuria     Microalbumin/Creatinine Ratio, Random Urine   <20 mg/g . . . . .. . . . Normal    mg/g . . . . . . . Microalbuminuria   >300 mg/g . . . . . . . . Clinical Proteinuria       ----- Service Performed and Documented by Resident or Fellow ------

## 2022-02-17 NOTE — NURSING NOTE
Due to patient being non-English speaking/uses sign language, an  was used for this visit. Only for face-to-face interpretation by an external agency, date and length of interpretation can be found on the scanned worksheet.     name: Mike Alberts  Agency: [employer]  Language: [place]   Telephone number: [phone]  Type of interpretation: Face-to-face, spoken

## 2022-02-17 NOTE — PROGRESS NOTES
Preceptor Attestation:   Patient seen, evaluated and discussed with the resident. I have verified the content of the note, which accurately reflects my assessment of the patient and the plan of care.    Supervising Physician:Rose Ritter MD    [facility]

## 2022-03-24 DIAGNOSIS — E11.9 TYPE 2 DIABETES MELLITUS WITHOUT COMPLICATION, WITHOUT LONG-TERM CURRENT USE OF INSULIN (H): ICD-10-CM

## 2022-03-24 NOTE — TELEPHONE ENCOUNTER
[facility] phone call message- medication clarification/question:    Full Medication Name: blood glucose (NO BRAND SPECIFIED) test strip    Additional Comments: Caller requesting refills for test strips.     Pharmacy confirmed as [facility] - [place], [place] - [address]: Yes    OK to leave a message on voice mail? Yes    Primary language: ong      needed? Yes    Call taken on March 24, 2022 at 4:28 PM by Nereida Arredondo

## 2023-02-28 NOTE — PROGRESS NOTES
ASSESSMENT:                            DM: Uncontrolled, A1c greater than goal <7%, with no SMBG levels to monitor.  Jardiance works best when taken at the beginning of the day. Patient is currently taking in the am after overnight shift before bed, would benefit from changing administration time to the meal before work as it helps to lower blood sugars with meals through urinary glucose excretion. Patient misses doses of metformin before bedtime and would benefit from reduction in amount of times medication is administered per day to improve adherence. Metformin is available in an extended release formulation allowing once daily dosing and all medications to be taken after waking, before work.  HTN: Uncontrolled, blood pressure above goal <140/90 mmHg. Patient would benefit from an additional medication to lower blood pressure as the maximum dosage of losartan has been reached. To decrease pill burden per patient preference, losartan/HCTZ is available in a combination pill.    PLAN:                            1. Start taking Jardiance after waking up, before work with a meal  2. Start taking losartan/HCTZ 100-12.5 mg daily.  3. Once you start the new blood pressure pill, stop the old blood pressure pill (Losartan)  4. Change your Metformin to 2 tablets once per day before you go to work     Follow-up: 1 month  Follow-up next visit regarding metformin dosing, opportunity to titrate?    SUBJECTIVE/OBJECTIVE:                          Zheng Llanos is a 51 year old male coming in for an follow visit, last seen 7/12/21. He was referred to me from Dr. Luis.  was used during today's visit.      DM:   Patient works long hours overnight.  Metformin 500 mg BID, gets tired after work and forgets to take before bed occasionally. He would like to just take medications before work.  Jardiance (empagliflozin) 10 mg in the morning after he gets off work, goes to bed right after.  Forgets to take medication once or twice,  unclear on how often one or two are missed.    Hasn't checked his BG in about a month, needs refills of the lancets and the strips.    Hasn't experienced lows - denies shakiness or sweating.    Lab Results   Component Value Date    A1C 9.3 07/12/2021    A1C 10.7 02/17/2021    A1C 9.9 09/29/2020    A1C 13.5 02/20/2020       HTN: taking losartan 100 mg every day, forgot to take this medication once    7/18 - 156/97   59  7/20 - 131/77   74  7/22 - 145/90   63  7/24 - 154/92   80  7/26 - 159/97   64    Higher numbers after a long shift and taking BP when tired. Denies low blood pressure, feeling faint, or feeling dizzy.  Willing to start another blood pressure medication and likes the idea of a combo pill.    Not taking any other medications.    Today's Vitals: BP (!) 146/97   Pulse 58   ----------------    I spent 45 minutes with this patient today. Any diabetes medication dose changes were made via the CDE Protocol and Collaborative Practice Agreement. A copy of the visit note was provided to the patient's primary care provider.    The patient was given a summary of these recommendations.     Radha Chopra, PD4  [employer] [facility] [employer] [facility] [employer]    Preceptor Attestation:  I was present with the pharmacy student who participated in the service and in the documentation of this note. I have verified the history, personally performed the medical decision making, and have verified the content of the note, which accurately reflects my assessment of the patient and the plan of care.     Chen Varma, PharmD, BCACP, CDE  [facility]  Phone: [phone]  August 22, 2021 at 10:03 PM       no

## 2023-06-28 ENCOUNTER — OFFICE VISIT (OUTPATIENT)
Dept: FAMILY MEDICINE | Facility: CLINIC | Age: 53
End: 2023-06-28
Payer: COMMERCIAL

## 2023-06-28 VITALS
WEIGHT: 138 LBS | BODY MASS INDEX: 25.4 KG/M2 | DIASTOLIC BLOOD PRESSURE: 75 MMHG | HEART RATE: 89 BPM | SYSTOLIC BLOOD PRESSURE: 105 MMHG | HEIGHT: 62 IN | RESPIRATION RATE: 18 BRPM | OXYGEN SATURATION: 96 %

## 2023-06-28 DIAGNOSIS — M10.9 ACUTE GOUT OF RIGHT ANKLE, UNSPECIFIED CAUSE: Primary | ICD-10-CM

## 2023-06-28 DIAGNOSIS — E11.9 TYPE 2 DIABETES MELLITUS WITHOUT COMPLICATION, WITHOUT LONG-TERM CURRENT USE OF INSULIN (H): ICD-10-CM

## 2023-06-28 LAB
ANION GAP SERPL CALCULATED.3IONS-SCNC: 11 MMOL/L (ref 7–15)
BUN SERPL-MCNC: 26.3 MG/DL (ref 6–20)
CALCIUM SERPL-MCNC: 9.6 MG/DL (ref 8.6–10)
CHLORIDE SERPL-SCNC: 98 MMOL/L (ref 98–107)
CHOLEST SERPL-MCNC: 206 MG/DL
CREAT SERPL-MCNC: 1.49 MG/DL (ref 0.67–1.17)
DEPRECATED HCO3 PLAS-SCNC: 21 MMOL/L (ref 22–29)
GFR SERPL CREATININE-BSD FRML MDRD: 56 ML/MIN/1.73M2
GLUCOSE SERPL-MCNC: 419 MG/DL (ref 70–99)
HBA1C MFR BLD: 10.8 % (ref 0–5.6)
HDLC SERPL-MCNC: 46 MG/DL
LDLC SERPL CALC-MCNC: 107 MG/DL
NONHDLC SERPL-MCNC: 160 MG/DL
POTASSIUM SERPL-SCNC: 3.9 MMOL/L (ref 3.4–5.3)
SODIUM SERPL-SCNC: 130 MMOL/L (ref 136–145)
TRIGL SERPL-MCNC: 267 MG/DL

## 2023-06-28 PROCEDURE — 36415 COLL VENOUS BLD VENIPUNCTURE: CPT | Performed by: MASSAGE THERAPIST

## 2023-06-28 PROCEDURE — 83036 HEMOGLOBIN GLYCOSYLATED A1C: CPT | Performed by: MASSAGE THERAPIST

## 2023-06-28 PROCEDURE — 80048 BASIC METABOLIC PNL TOTAL CA: CPT | Performed by: MASSAGE THERAPIST

## 2023-06-28 PROCEDURE — 99214 OFFICE O/P EST MOD 30 MIN: CPT | Mod: GC | Performed by: MASSAGE THERAPIST

## 2023-06-28 PROCEDURE — 80061 LIPID PANEL: CPT | Performed by: MASSAGE THERAPIST

## 2023-06-28 RX ORDER — COLCHICINE 0.6 MG/1
0.6 TABLET ORAL DAILY
Qty: 90 TABLET | Refills: 1 | Status: SHIPPED | OUTPATIENT
Start: 2023-06-28

## 2023-06-28 NOTE — LETTER
June 28, 2023      Zheng Llanos  [address] [place] [address]        To Whom it may concern-     Zheng Llanos was seen in clinic today.  He is not able to walk or stand for significant periods and should be excused from work until at least 6/30/2020    Sincerely,        Michael Mclaughlin MD

## 2023-06-28 NOTE — PROGRESS NOTES
"  Assessment & Plan     Acute gout of right ankle, unspecified cause  Second gout flare in the last month.  We will start with colchicine until symptoms resolve.  We did discuss today that he will likely need to start allopurinol to prevent flares.  Also reminded him to avoid ibuprofen given his reduced kidney function.  Given his uncontrolled diabetes he is not a candidate for a prednisone course.  - colchicine (COLCRYS) 0.6 MG tablet; Take 1 tablet (0.6 mg) by mouth daily    Type 2 diabetes mellitus without complication, without long-term current use of insulin (H)  Has not been taking diabetes medications since February of this year.  Does not check sugars at home.  Last A1c, February 2022, was elevated at 11.8.  He is due for some labs which we will get today.  We will discuss these at his follow-up visit in 1 week.  - Hemoglobin A1c; Future  - Basic metabolic panel; Future  - Lipid Profile; Future  - Miscellaneous Order for DME - ONLY FOR DME       BMI:   Estimated body mass index is 25.24 kg/m  as calculated from the following:    Height as of this encounter: 1.575 m (5' 2\").    Weight as of this encounter: 62.6 kg (138 lb).         No follow-ups on file.    Michael Mclaughlin MD  [facility] Subjective Soua is a 53 year old, presenting for the following health issues:  Foot Pain (Since Tuesday )         No data to display              HPI     Left Foot/Ankle pain:   Location: Heel/ankle   Duration: started yesterday    Trauma/ Fall?  No injury   Able to walk? Yes, but painful   Swelling? yes  Bruising? no  Numbness/ Tingling? No    History of gout in toe, this pain feels the same, just in a different location.  Had a flare last month resolved on its own     Has uncontrolled diabetes  Does not check sugars at home   Not taking insulin  Not taking any diabetes medicines           Objective    /75   Pulse 89   Resp 18   Ht 1.575 m (5' 2\")   Wt 62.6 kg (138 lb)   SpO2 96%  "  BMI 25.24 kg/m    Body mass index is 25.24 kg/m .  Physical Exam   GENERAL: healthy, alert and no distress  RESP: speaking in full sentences, normal work of breathing   CV: extremities well perfused  MS: no gross musculoskeletal defects noted, no edema  PSYCH: mentation appears normal, affect normal/bright      Left Foot/Ankle:   Inspection: Significant swelling to medial aspect of heel, midfoot.  Erythematous.  No bruising  Sensation: intact in peroneal, tibial, sural, and saphenous distribution   ROM: Limited by pain      ----- Service Performed and Documented by Resident or Fellow ------

## 2023-06-29 NOTE — PROGRESS NOTES
Preceptor Attestation:  Patient's case reviewed and discussed with Michael Mclaughlin MD resident and I evaluated the patient. I agree with written assessment and plan of care.  Supervising Physician:  Nicholas Ratliff MD, MD [facility]

## 2023-07-06 ENCOUNTER — OFFICE VISIT (OUTPATIENT)
Dept: FAMILY MEDICINE | Facility: CLINIC | Age: 53
End: 2023-07-06
Payer: COMMERCIAL

## 2023-07-06 VITALS
RESPIRATION RATE: 20 BRPM | BODY MASS INDEX: 24.33 KG/M2 | DIASTOLIC BLOOD PRESSURE: 88 MMHG | SYSTOLIC BLOOD PRESSURE: 132 MMHG | WEIGHT: 133 LBS

## 2023-07-06 DIAGNOSIS — E11.9 TYPE 2 DIABETES MELLITUS WITHOUT COMPLICATION, WITHOUT LONG-TERM CURRENT USE OF INSULIN (H): ICD-10-CM

## 2023-07-06 DIAGNOSIS — N18.31 CHRONIC KIDNEY DISEASE, STAGE 3A (H): ICD-10-CM

## 2023-07-06 DIAGNOSIS — M1A.0720 IDIOPATHIC CHRONIC GOUT OF LEFT ANKLE WITHOUT TOPHUS: Primary | ICD-10-CM

## 2023-07-06 PROCEDURE — 99214 OFFICE O/P EST MOD 30 MIN: CPT | Mod: GC | Performed by: MASSAGE THERAPIST

## 2023-07-06 PROCEDURE — 36415 COLL VENOUS BLD VENIPUNCTURE: CPT | Performed by: MASSAGE THERAPIST

## 2023-07-06 PROCEDURE — 84550 ASSAY OF BLOOD/URIC ACID: CPT | Performed by: MASSAGE THERAPIST

## 2023-07-06 RX ORDER — FLURBIPROFEN SODIUM 0.3 MG/ML
SOLUTION/ DROPS OPHTHALMIC
COMMUNITY
Start: 2022-08-19

## 2023-07-06 RX ORDER — METFORMIN HCL 500 MG
500 TABLET, EXTENDED RELEASE 24 HR ORAL 2 TIMES DAILY WITH MEALS
Qty: 180 TABLET | Refills: 3 | Status: SHIPPED | OUTPATIENT
Start: 2023-07-06 | End: 2023-07-21

## 2023-07-06 RX ORDER — UBIQUINOL 100 MG
CAPSULE ORAL
COMMUNITY
Start: 2022-08-19

## 2023-07-06 RX ORDER — ALLOPURINOL 100 MG/1
100 TABLET ORAL DAILY
Qty: 14 TABLET | Refills: 0 | Status: SHIPPED | OUTPATIENT
Start: 2023-07-06 | End: 2023-07-21

## 2023-07-06 RX ORDER — BLOOD-GLUCOSE METER
EACH MISCELLANEOUS
COMMUNITY
Start: 2022-08-19

## 2023-07-06 RX ORDER — METFORMIN HCL 500 MG
1000 TABLET, EXTENDED RELEASE 24 HR ORAL DAILY
Qty: 180 TABLET | Refills: 3 | Status: SHIPPED | OUTPATIENT
Start: 2023-07-06 | End: 2023-07-06

## 2023-07-06 NOTE — PROGRESS NOTES
"  Assessment & Plan     1. Idiopathic chronic gout of left ankle without tophus  Patient has had near resolution of symptoms since previous visit with only 1-2/10 pain remaining. Did take colchicine for 6 days total, though discontinued because of side effects including feeling \"tightening\" of skin and dark spots on ankle that resolved with stopping medication. Range of motion full, non-tender to palpation, no erythema. Discussed starting prophylactic medication to prevent future gout flares, which patient is willing to try. Will follow up in 2 weeks to measure uric acid and titrate allopurinol. Goal uric acid <6.  Discussed that may take a few visits q2w to find optimal dose.   - Uric acid; Future  - Uric acid  - allopurinol (ZYLOPRIM) 100 MG tablet; Take 1 tablet (100 mg) by mouth daily  Dispense: 14 tablet; Refill: 0    2. Type 2 diabetes mellitus without complication, without long-term current use of insulin (H)  Patient has poorly controlled diabetes and reports not taking any medications (previously on jardiance, metformin, and glargine 14u) since February 2023. States this is primarily because of blurry vision associated with one of the medication, but not sure which one. Also never took glargine. Counseled patient on importance of managing blood sugars to prevent future complications, particularly worsening kidney function. He is open to restarting oral medication today, not interested in injectables. Will restart Metformin today and monitor tolerance closely at future visits.  - metFORMIN (GLUCOPHAGE XR) 500 MG 24 hr tablet; Take 1 tablet (500 mg) by mouth 2 times daily (with meals)  Dispense: 180 tablet; Refill: 3    3. Chronic kidney disease, stage 3a (H)  In the context of poorly controlled type 2 diabetes. Creatinine 6/28 was 1.49. Discussed importance of controlling blood sugar to prevent further deterioration of kidney function and possible need for more intensive interventions in the future like " dialysis. Recommend collecting microalbumin at future visit and discussion of starting ACE-I/ARB for renal protection.       Return in about 2 weeks (around 7/20/2023).    Soham Piper, MS4  Medical Student    I was present with the medical student who participated in the service and in the documentation of this note. I have verified the history and personally performed the physical exam and medical decision making, and have verified the content of the note, which accurately reflects my assessment of the patient and the plan of care.     Michael Mclaughlin MD, PGY3  [facility]   Pgr: [phone]      Marcelo Calzada is a 53 year old, presenting for the following health issues:  Acute on Chronic Gout Flare, Type 2 Diabetes Mellitus        6/28/2023     9:49 AM   Additional Questions   Roomed by rosalind georges   Accompanied by son     HPI   Gout  Last seen in clinic by Dr. Mclaughlin 6/28/2023, at that visit started colchicine.    Since that time he reports that his gout pain is feeling better, now 1-2/10. He stopped colchicine after 6 days total due to drier skin and a dark spot on foot that resolved when he stopped, as well as his skin feeling tight.    He is interested in allopurinol to prevent future gout flares. He is concerned about having similar side effects as colchicine. We discussed they work in different ways and will give it a try. He understands he will need to follow up in 2 weeks to measure uric acid and titrate allopurinol as needed.    Diabetes  At last visit had not been taking diabetes medications since February. Previous regimen included Jardiance, metformin and 14 units of Lantus at bedtime. States he stopped because of blurry vision with medication, but he is not sure which one. He also never actually took insulin.     We checked his A1c last visit and it was elevated to 10.8 with glucose 419. He states he feels well overall, has no polyuria or polydipsia, and notices  decreased energy when he is on medications. Discussed the importance of blood sugar control to prevent long term side effects, particularly worsening of his kidney disease. He is interested in restarting oral medications, but does not want any injectables at this time.      Review of Systems   CONSTITUTIONAL: NEGATIVE for fever, chills  INTEGUMENTARY/SKIN: Negative for erythema, rashes  MSK: Positive for mild pain of L ankle  GI/: Negative for increased thirst, increased urinary frequency  CV: NEGATIVE peripheral edema       Objective    /88   Resp 20   Wt 60.3 kg (133 lb)   BMI 24.33 kg/m    Body mass index is 24.33 kg/m .  Physical Exam   GENERAL: healthy, alert. Sitting in chair in no distress  RESP: No increased work of breathing, chest rise equal bilaterally  CV: no peripheral edema, pedal pulses pulses strong and symmetric  MS: no gross musculoskeletal defects noted, no edema. Full passive and active range of motion of left foot/ankle without pain.  SKIN: no suspicious lesions or rashes. No erythema of left medial malleolus or left midfoot, improved from previous visit.  NEURO: Normal strength and tone, mentation intact and speech normal. Sensation to light touch intact in bilateral distal lower extremities.  PSYCH: mentation appears normal, affect appropriate    ----- Service Performed and Documented by Resident or Fellow ------

## 2023-07-06 NOTE — PATIENT INSTRUCTIONS
For your Gout:  Start taking 100 mg of allopurinol every day.  Come back to clinic in 2 weeks to check your uric acid level.  We may need to increase the dose of this medicine.     For your Diabetes:  Start taking one metformin pill in the mornings and one at night

## 2023-07-07 LAB — URATE SERPL-MCNC: 7.9 MG/DL (ref 3.4–7)

## 2023-07-21 ENCOUNTER — OFFICE VISIT (OUTPATIENT)
Dept: FAMILY MEDICINE | Facility: CLINIC | Age: 53
End: 2023-07-21
Payer: COMMERCIAL

## 2023-07-21 VITALS
HEIGHT: 63 IN | OXYGEN SATURATION: 98 % | BODY MASS INDEX: 24.64 KG/M2 | HEART RATE: 71 BPM | SYSTOLIC BLOOD PRESSURE: 144 MMHG | DIASTOLIC BLOOD PRESSURE: 87 MMHG | WEIGHT: 139.04 LBS

## 2023-07-21 DIAGNOSIS — E11.9 TYPE 2 DIABETES MELLITUS WITHOUT COMPLICATION, WITHOUT LONG-TERM CURRENT USE OF INSULIN (H): ICD-10-CM

## 2023-07-21 DIAGNOSIS — N18.31 CHRONIC KIDNEY DISEASE, STAGE 3A (H): ICD-10-CM

## 2023-07-21 DIAGNOSIS — M1A.0720 IDIOPATHIC CHRONIC GOUT OF LEFT ANKLE WITHOUT TOPHUS: Primary | ICD-10-CM

## 2023-07-21 PROCEDURE — 99214 OFFICE O/P EST MOD 30 MIN: CPT | Mod: GC | Performed by: STUDENT IN AN ORGANIZED HEALTH CARE EDUCATION/TRAINING PROGRAM

## 2023-07-21 RX ORDER — LOSARTAN POTASSIUM 25 MG/1
25 TABLET ORAL DAILY
Qty: 30 TABLET | Refills: 0 | Status: SHIPPED | OUTPATIENT
Start: 2023-07-21 | End: 2023-08-20

## 2023-07-21 RX ORDER — METFORMIN HCL 500 MG
1000 TABLET, EXTENDED RELEASE 24 HR ORAL
Qty: 180 TABLET | Refills: 3 | Status: SHIPPED | OUTPATIENT
Start: 2023-07-21 | End: 2023-08-14

## 2023-07-21 RX ORDER — ALLOPURINOL 100 MG/1
100 TABLET ORAL DAILY
Qty: 90 TABLET | Refills: 0 | Status: SHIPPED | OUTPATIENT
Start: 2023-07-21

## 2023-07-21 NOTE — PROGRESS NOTES
Assessment & Plan     Idiopathic chronic gout of left ankle without tophus  Reports near total improvement since starting allopurinol. Range of motion full, non-tender to palpation, no erythema. Will come to get labs taken on Monday to guide future dosing.  - Recheck Uric acid on Monday. If the value is greater than 6, increase allopurinol to 200mg to manage gout effectively.  - Increase metformin dosage to 1000mg BID due to A1c level of 10, aiming to improve glycemic control.  - Encourage regular blood sugar monitoring to track progress.  - Discuss dietary modifications to include more servings of fruits and vegetables.  - Continue regular exercise regimen to support glucose management.  - No need for insulin at present, but monitor closely.  - Address any symptoms or concerns related to diabetes during future visits.    Type 2 diabetes mellitus without complication, without long-term current use of insulin (H)  Tolerating metformin well. Counseled patient on importance of managing blood sugars to prevent future complications, particularly worsening kidney function.  - Increase metformin to 2000mg daily due to A1c being 10 and being well tolerated.    Chronic kidney disease, stage 3a (H)  In the context of poorly controlled type 2 diabetes. Creatinine 6/28 was 1.49. Discussed importance of controlling blood sugar to prevent further deterioration of kidney function and possible need for more intensive interventions in the future like dialysis. Pt is agreeable to starting ARB today. Will discuss potentially starting a statin at a future visit.  - Losartan 25mg   - Discuss starting statin at next visit       Ronal Caban, MS3    Karina Petersen MD  [facility]    Marcelo   [facility] is [facility] 53-[facility] patient presenting with arthritis and seeking results for blood tests. He reports experiencing no pain in the bottom of his right foot at present. Additionally, he follows up on his diabetes  condition but does not check his blood sugar regularly and has no specific concerns related to diabetes. Zheng reports experiencing some tingling in his feet but denies redness, sores, blisters, excessive thirst, or changes in weight.        7/6/2023     4:00 PM   Additional Questions   Roomed by Shayla   Accompanied by son     HPI     Diabetes Follow-up  How often are you checking your blood sugar? Not at all  What concerns do you have today about your diabetes? None   Do you have any of these symptoms? (Select all that apply)  Some tingling in feet.  No redness, sores or blisters on feet.  No complaints of excessive thirst.  No reports of blurry vision.  No significant changes to weight.    BP Readings from Last 2 Encounters:   07/21/23 (!) 144/87   07/06/23 132/88     Hemoglobin A1C (%)   Date Value   06/28/2023 10.8 (H)   02/15/2022 11.8 (H)   02/17/2021 10.7 (H)   09/29/2020 9.9 (H)     LDL Cholesterol Calculated   Date Value   06/28/2023 107 mg/dL (H)   02/15/2022      Comment:     Cannot estimate LDL when triglyceride exceeds 400 mg/dL   09/29/2020 See Note.         How many servings of fruits and vegetables do you eat daily?  0-1  On average, how many sweetened beverages do you drink each day (Examples: soda, juice, sweet tea, etc.  Do NOT count diet or artificially sweetened beverages)?   0  How many days per week do you exercise enough to make your heart beat faster? 3  How many minutes a day do you exercise enough to make your heart beat faster? 60 or more  How many days per week do you miss taking your medication? 0    Gout  Patient here for evaluation of acute gouty arthritis. The patient reports no acute gout attacks since last clinic visit. Attacks occur primarily in the left ankle and foot. Patient reports his chronic pain is improved, his joint stiffness is improved and his joint swelling is improved. Limitation on activities include none. The patient is avoiding high purine foods and reports consuming  "0 alcoholic drinks.    Review of Systems   Constitutional, HEENT, cardiovascular, pulmonary, gi and gu systems are negative, except as otherwise noted.      Objective    BP (!) 144/87   Pulse 71   Ht 1.595 m (5' 2.8\")   Wt 63.1 kg (139 lb 0.6 oz)   SpO2 98%   BMI 24.79 kg/m    Body mass index is 24.79 kg/m .     Physical Exam   GENERAL: healthy, alert and no distress  NECK: no adenopathy, no asymmetry, masses, or scars and thyroid normal to palpation  RESP: lungs clear to auscultation - no rales, rhonchi or wheezes  CV: regular rate and rhythm, normal S1 S2, no S3 or S4, no murmur, click or rub, no peripheral edema and peripheral pulses strong  ABDOMEN: soft, nontender, no hepatosplenomegaly, no masses and bowel sounds normal  MS: no gross musculoskeletal defects noted, no edema     Resident/Fellow Attestation   I, Karina Petersen MD, was present with the medical/DUGLAS student who participated in the service and in the documentation of the note.  I have verified the history and personally performed the physical exam and medical decision making.  I agree with the assessment and plan of care as documented in the note.      Karina Petersen MD  PGY3      "

## 2023-07-24 ENCOUNTER — LAB (OUTPATIENT)
Dept: LAB | Facility: CLINIC | Age: 53
End: 2023-07-24
Payer: COMMERCIAL

## 2023-07-24 DIAGNOSIS — M1A.0720 IDIOPATHIC CHRONIC GOUT OF LEFT ANKLE WITHOUT TOPHUS: ICD-10-CM

## 2023-07-24 DIAGNOSIS — N18.31 CHRONIC KIDNEY DISEASE, STAGE 3A (H): ICD-10-CM

## 2023-07-24 LAB
CREAT UR-MCNC: 128 MG/DL
MICROALBUMIN UR-MCNC: 175 MG/L
MICROALBUMIN/CREAT UR: 136.72 MG/G CR (ref 0–17)

## 2023-07-24 PROCEDURE — 82570 ASSAY OF URINE CREATININE: CPT

## 2023-07-24 PROCEDURE — 84550 ASSAY OF BLOOD/URIC ACID: CPT

## 2023-07-24 PROCEDURE — 82043 UR ALBUMIN QUANTITATIVE: CPT

## 2023-07-24 PROCEDURE — 36415 COLL VENOUS BLD VENIPUNCTURE: CPT

## 2023-07-25 LAB — URATE SERPL-MCNC: 7.3 MG/DL (ref 3.4–7)

## 2023-07-26 NOTE — PROGRESS NOTES
Preceptor Attestation:   Patient seen, evaluated and discussed with the resident. I have verified the content of the note, which accurately reflects my assessment of the patient and the plan of care.    Supervising Physician:Phillip Betancur MD    [facility]   Speech Treatment Note    Today's date: 2022  Patient name: Dilma Cobian  : 2017  MRN: [id number]  Referring provider: Ish Segura PA-C  Dx:   Encounter Diagnosis     ICD-10-CM    1  Childhood apraxia of speech  R48 2    2  Mixed receptive-expressive language disorder  F80 2        Start Time: 0930  Stop Time: 1000  Total time in clinic (min): 30 minutes    Visit Number:  exp 7/15/22    Subjective/Behavioral: Rahel Christianson was accompanied to therapy by his mother and younger sister  He transitioned with ease today and was active but overall cooperative throughout session  Therapy consisted of embedded language activities and traditional articulation approaches with multi-modal cueing  Seen with PT    Goals  Short Term Goals:   Goal 1: Pt will increase production of 2-3 word combinations to request, comment and/or reject 80%of the time with min cues  Targeting of specific phrases related to story read aloud, "I see (color), (color) egg, I have ___"; with mod-max cues pt was able to imitatively use phrases >5x with varying intelligibility following direct models  Goal 2: Pt will imitate CVC words (e g  bat, hat, pop, mom, etc) in 8/10 opp with min cues  During drill-based tasks pt was able to produce CVC targets  opp with multi-modal cues and max support   Improved isolated productions for: /b/, /n/, /sh/, /s/  Goal 3: Pt will increase use of approximated action words and fringe vocabulary (colors, toys, foods, animals, etc) in 8/10 opp with min cues  Able to label/approximate fringe vocabulary in pictures using approximated productions  opp  Goal 4: Pt will verbalize answers to basic "wh" questions related to basic concepts, therapy environment and/or stories read aloud on  opp  Able to ID answers "what" and "who" questions related to objects on 8/10 opp when given visual cues  Goal 5: Pt will demonstrate understanding of basic concepts (colors, shapes, locations, quantities, etc ) on 4/5 opp in structured tasks  Able to ID colors from field of 6 options with 100% acc  With min cues he was able to count quantities 1-5 today    Long Term Goals:  LT Goal 1: Raheem Parisi will improve his receptive language skills to [facility]  LT Goal 2: Raheem Parisi will improve his expressive language skills to [facility]  LT Goal 3: Raheem Parisi will improve his speech/articulation skills to [facility]    Family Goal: Pierre's family would like his speech to improve so peers and unfamiliar people can understand him  Other:Discussed session and patient progress with caregiver/family member after today's session    Recommendations:Continue with Plan of Care

## 2023-07-28 ENCOUNTER — APPOINTMENT (OUTPATIENT)
Dept: INTERPRETER SERVICES | Facility: CLINIC | Age: 53
End: 2023-07-28
Payer: COMMERCIAL

## 2023-08-03 ENCOUNTER — APPOINTMENT (OUTPATIENT)
Dept: INTERPRETER SERVICES | Facility: CLINIC | Age: 53
End: 2023-08-03
Payer: COMMERCIAL

## 2023-08-14 ENCOUNTER — HOSPITAL ENCOUNTER (OUTPATIENT)
Dept: CT IMAGING | Facility: HOSPITAL | Age: 53
Discharge: HOME | End: 2023-08-14
Attending: FAMILY MEDICINE | Admitting: FAMILY MEDICINE
Payer: COMMERCIAL

## 2023-08-14 ENCOUNTER — OFFICE VISIT (OUTPATIENT)
Dept: FAMILY MEDICINE | Facility: CLINIC | Age: 53
End: 2023-08-14
Payer: COMMERCIAL

## 2023-08-14 VITALS
WEIGHT: 139 LBS | HEART RATE: 72 BPM | OXYGEN SATURATION: 98 % | SYSTOLIC BLOOD PRESSURE: 138 MMHG | BODY MASS INDEX: 24.78 KG/M2 | DIASTOLIC BLOOD PRESSURE: 91 MMHG

## 2023-08-14 DIAGNOSIS — N18.31 CHRONIC KIDNEY DISEASE, STAGE 3A (H): ICD-10-CM

## 2023-08-14 DIAGNOSIS — M54.6 ACUTE MIDLINE THORACIC BACK PAIN: Primary | ICD-10-CM

## 2023-08-14 DIAGNOSIS — I10 BENIGN ESSENTIAL HYPERTENSION: ICD-10-CM

## 2023-08-14 DIAGNOSIS — E11.9 TYPE 2 DIABETES MELLITUS WITHOUT COMPLICATION, WITHOUT LONG-TERM CURRENT USE OF INSULIN (H): ICD-10-CM

## 2023-08-14 DIAGNOSIS — M54.6 ACUTE MIDLINE THORACIC BACK PAIN: ICD-10-CM

## 2023-08-14 PROCEDURE — 82565 ASSAY OF CREATININE: CPT

## 2023-08-14 PROCEDURE — 250N000011 HC RX IP 250 OP 636: Mod: JZ | Performed by: FAMILY MEDICINE

## 2023-08-14 PROCEDURE — 74174 CTA ABD&PLVS W/CONTRAST: CPT

## 2023-08-14 PROCEDURE — 99214 OFFICE O/P EST MOD 30 MIN: CPT | Mod: GC

## 2023-08-14 RX ORDER — METFORMIN HCL 500 MG
1000 TABLET, EXTENDED RELEASE 24 HR ORAL
Qty: 180 TABLET | Refills: 3 | Status: SHIPPED | OUTPATIENT
Start: 2023-08-14

## 2023-08-14 RX ORDER — IOPAMIDOL 755 MG/ML
75 INJECTION, SOLUTION INTRAVASCULAR ONCE
Status: COMPLETED | OUTPATIENT
Start: 2023-08-14 | End: 2023-08-14

## 2023-08-14 RX ADMIN — IOPAMIDOL 75 ML: 755 INJECTION, SOLUTION INTRAVENOUS at 19:11

## 2023-08-14 NOTE — PROGRESS NOTES
Preceptor Attestation:  Patient's case reviewed and discussed with the resident, Rafael White MD, and I personally evaluated the patient. I agree with written assessment and plan of care.    Supervising Physician:  Cecile Yin MD   [facility]

## 2023-08-14 NOTE — PROGRESS NOTES
"  Assessment & Plan     (M54.6) Acute midline thoracic back pain  (primary encounter diagnosis)  (I10) Benign essential hypertension  (N18.31) Chronic kidney disease, stage 3a (H)  Comment: Most likely, patient's backpain is musculoskeletal in origin, however his history is not entirely convincing of this given that he has no inciting event or overuse of muscles that would likely cause this type of MSK pain. Given the description of midline thoracic back pain in the setting of poorly controlled hypertension/T2DM, patient should have CTA CAP done to rule out a more sinister etiology such as aortic dissection or AAA. Could also consider nephrolithiasis,, however he has no CVA tenderness or nausea/vomiting or dysuria, etc..  Plan:   - Patient advised to present to the ED if he experiences acute worsening of symptoms  - CTA Chest Abdomen Pelvis w/Contrast   - Scheduled to be completed 8/14  - POINT OF CARE CREATININE  - Follow up with results of scan  - Consider muscle relaxer if it is determined to be MSK in origin, hold off on NSAIDs given hx of CKD  - Plan to hold metformin until after CT scan given need for contrast    (E11.9) Type 2 diabetes mellitus without complication, without long-term current use of insulin (H)  Comment: History of poorly controlled diabetes type 2, on metformin. Unclear how much he is taking but he stated \"once a day\". Last A1C was 10.6% in June. He needs a refill of his metformin because he lost the medication.  Plan:   - Refill metFORMIN (GLUCOPHAGE XR) 500 MG 24 hr tablet  - Hold metformin until after CT scan given need for IV contrast  - Needs dedicated diabetes visit      Jayson Ash, MS4  [employer]  08/14/23 2:02 PM    Resident/Fellow Attestation   I, Rafael White MD, was present with the medical/DUGLAS student who participated in the service and in the documentation of the note.  I have verified the history and personally performed the physical exam and " "medical decision making.  I agree with the assessment and plan of care as documented in the note.      Rafael White MD  PGY3      Marcelo Calzada is a 53 year old male with a past medical history significant for T2DM without insulin use, CKDIIIa, and gout. He is presenting to the clinic for a 5 day history of \"severe back pain\".  Back Pain (Whole back is in pain. Pt stated start last week Wednesday. Unsure why pt did not do anything to aggravate back.), Refill Request (Unsure which ones ), and Letter for School/Work (Work excuse for today)    HPI   \"Severe back pain, thought he would come see a doctor\"  Started wednesday  Describes a \"hollow\" burning pain All down the back. No weakness. No limited ROM.  Gradual onset. Does not remember any inciting event. No heavy lifting. No radiation of pain and no pain in the neck or legs. No loss of bowel or bladder function.  Pain was an 8 or 9 out of 10 on Thursday and Friday last week but is a 5 or 6 out of 10 today.  Not tried any OTC pain meds or anything. Massage seems to help. Pain is not worsened with activity or motion.    Wife has history of kidney stone, states his symptoms are similar to when she had hers.  He has Never had stone in past.  NO nausea or vomiting  NO tenderness, had kids \"pound on his back and it helped relieve the pain a little bit'. No dysuria or hematuria  No constipation or diarrhea  No fever symptoms, chills or shakes  No loss of bowel or bladder function    Patient also complains of Shortness of breath that started with onset of back pain though this has resolved. He states it was a \"discomfort in the chest\" moreso than a difficulty breathing. Better now, but was worse at first and came on with the back pain.    Patient has been taking all medication as prescribed, but misplaced his metformin recently. Unsure of doses. Requesting refill.   Last A1C 10.8% on 6/28/23.    Review of Systems   See HPI      Objective    BP (!) 138/91 (BP " Location: Right arm, Patient Position: Sitting, Cuff Size: Adult Regular)   Pulse 72   Wt 63 kg (139 lb)   SpO2 98%   BMI 24.78 kg/m    Body mass index is 24.78 kg/m .    Physical Exam   GENERAL: Appears healthy overall, alert and no apparent distress, appears comfortable  RESP: lungs clear to auscultation - no rales, rhonchi or wheezes  CV: regular rate and rhythm, normal S1 S2, no S3 or S4, no murmur, click or rub, no peripheral edema and peripheral pulses strong  ABDOMEN: soft, rounded, nontender  MS: no gross musculoskeletal defects noted, Full strength and ROM in back and Lower extremities, no edema, mild paravertebral muscle tenderness to palpation. 5/5 strength with flexion of the knee. 5/5 strength with planter flexion. Sensation intact in the lower extremities.     Lab on 07/24/2023   Component Date Value Ref Range Status    Uric Acid 07/24/2023 7.3 (H)  3.4 - 7.0 mg/dL Final    Creatinine Urine mg/dL 07/24/2023 128.0  mg/dL Final    The reference ranges have not been established in urine creatinine. The results should be integrated into the clinical context for interpretation.    Albumin Urine mg/L 07/24/2023 175.0  mg/L Final    The reference ranges have not been established in urine albumin. The results should be integrated into the clinical context for interpretation.    Albumin Urine mg/g Cr 07/24/2023 136.72 (H)  0.00 - 17.00 mg/g Cr Final    Microalbuminuria is defined as an albumin:creatinine ratio of 17 to 299 for males and 25 to 299 for females. A ratio of albumin:creatinine of 300 or higher is indicative of overt proteinuria.  Due to biologic variability, positive results should be confirmed by a second, first-morning random or 24-hour timed urine specimen. If there is discrepancy, a third specimen is recommended. When 2 out of 3 results are in the microalbuminuria range, this is evidence for incipient nephropathy and warrants increased efforts at glucose control, blood pressure control, and  institution of therapy with an angiotensin-converting-enzyme (ACE) inhibitor (if the patient can tolerate it).

## 2023-08-14 NOTE — LETTER
[facility] [address] [place] [address]  Phone: [phone]  Fax: [phone]    August 14, 2023        Zheng Llanos  [address]  [place] [address]          To whom it may concern:    RE: Zheng Llanos    Patient was seen and treated today at our clinic and missed work.    Please contact me for questions or concerns.      Sincerely,        Rafael White MD

## 2023-08-15 ENCOUNTER — APPOINTMENT (OUTPATIENT)
Dept: INTERPRETER SERVICES | Facility: CLINIC | Age: 53
End: 2023-08-15
Payer: COMMERCIAL

## 2023-08-15 LAB
CREAT BLD-MCNC: 1.5 MG/DL (ref 0.7–1.3)
GFR SERPL CREATININE-BSD FRML MDRD: 55 ML/MIN/1.73M2
RADIOLOGIST FLAGS: NORMAL

## 2023-08-16 ENCOUNTER — APPOINTMENT (OUTPATIENT)
Dept: INTERPRETER SERVICES | Facility: CLINIC | Age: 53
End: 2023-08-16
Payer: COMMERCIAL

## 2023-08-22 ENCOUNTER — APPOINTMENT (OUTPATIENT)
Dept: INTERPRETER SERVICES | Facility: CLINIC | Age: 53
End: 2023-08-22
Payer: COMMERCIAL

## 2023-09-06 ENCOUNTER — OFFICE VISIT (OUTPATIENT)
Dept: FAMILY MEDICINE | Facility: CLINIC | Age: 53
End: 2023-09-06
Payer: COMMERCIAL

## 2023-09-06 VITALS
HEART RATE: 78 BPM | SYSTOLIC BLOOD PRESSURE: 137 MMHG | HEIGHT: 64 IN | WEIGHT: 135 LBS | TEMPERATURE: 97.6 F | DIASTOLIC BLOOD PRESSURE: 91 MMHG | OXYGEN SATURATION: 99 % | BODY MASS INDEX: 23.05 KG/M2

## 2023-09-06 DIAGNOSIS — E04.1 THYROID NODULE: Primary | ICD-10-CM

## 2023-09-06 DIAGNOSIS — Z51.81 MEDICATION MONITORING ENCOUNTER: ICD-10-CM

## 2023-09-06 DIAGNOSIS — R49.9 CHANGE IN VOICE: ICD-10-CM

## 2023-09-06 DIAGNOSIS — N18.31 CHRONIC KIDNEY DISEASE, STAGE 3A (H): ICD-10-CM

## 2023-09-06 DIAGNOSIS — E11.65 TYPE 2 DIABETES MELLITUS WITH HYPERGLYCEMIA, WITHOUT LONG-TERM CURRENT USE OF INSULIN (H): ICD-10-CM

## 2023-09-06 DIAGNOSIS — M54.6 ACUTE BILATERAL THORACIC BACK PAIN: ICD-10-CM

## 2023-09-06 LAB
ANION GAP SERPL CALCULATED.3IONS-SCNC: 10 MMOL/L (ref 3–14)
BUN SERPL-MCNC: 29 MG/DL (ref 7–30)
CALCIUM SERPL-MCNC: 9.8 MG/DL (ref 8.5–10.1)
CHLORIDE BLD-SCNC: 99 MMOL/L (ref 94–109)
CO2 SERPL-SCNC: 24 MMOL/L (ref 20–32)
CREAT SERPL-MCNC: 1.5 MG/DL (ref 0.66–1.25)
EGFRCR SERPLBLD CKD-EPI 2021: 55 ML/MIN/1.73M2
GLUCOSE BLD-MCNC: 556 MG/DL (ref 70–99)
POTASSIUM BLD-SCNC: 4.3 MMOL/L (ref 3.4–5.3)
SODIUM SERPL-SCNC: 133 MMOL/L (ref 133–144)

## 2023-09-06 PROCEDURE — 36415 COLL VENOUS BLD VENIPUNCTURE: CPT

## 2023-09-06 PROCEDURE — 80048 BASIC METABOLIC PNL TOTAL CA: CPT

## 2023-09-06 PROCEDURE — 84443 ASSAY THYROID STIM HORMONE: CPT

## 2023-09-06 PROCEDURE — 99214 OFFICE O/P EST MOD 30 MIN: CPT | Mod: GC

## 2023-09-06 RX ORDER — CYCLOBENZAPRINE HCL 5 MG
5 TABLET ORAL 3 TIMES DAILY PRN
Qty: 30 TABLET | Refills: 0 | Status: SHIPPED | OUTPATIENT
Start: 2023-09-06

## 2023-09-06 NOTE — PROGRESS NOTES
Assessment & Plan   53-year-old with a history of chronic kidney disease, diabetes, hypertension.  He is presenting to follow-up incidental thyroid nodule, and back pain.      Thyroid nodule  Incidentally found on CT.  Will obtain ultrasound for further characterization.  We did discuss the possibility of needing FNA for further characterization.  No symptoms of hyperthyroidism.  We will obtain baseline thyroid function labs    - TSH with free T4 reflex  - TSH with free T4 reflex  - US Thyroid    Chronic kidney disease, stage 3a (H)  Creatinine stable at 1.50, GFR 55.  Currently on an ARB.  May benefit from SGLT2 with proteinuria/diabetes.  Again discussed importance of blood pressure/diabetes management.  - Basic metabolic panel    Acute bilateral thoracic back pain  Ongoing thoracic back pain.  Initially we were concerned for insidious etiology such as a aortic dissection with description.  CT of his abdomen chest without any pathology in this area..  Symptoms improving, but still present.  We will try short course of Flexeril to see if symptoms improve.  Recommended massage to the area.  Could consider PT referral  - cyclobenzaprine (FLEXERIL) 5 MG tablet  Dispense: 30 tablet; Refill: 0    Type 2 diabetes mellitus with hyperglycemia, without long-term current use of insulin (H)  Most recent hemoglobin A1c of 10.8, which is improved from prior at 11.8.  Significant hyperglycemia today in clinic.  He was asymptomatic.  No evidence of acidosis on BMP.  Long discussion today regarding risks of uncontrolled type 2 diabetes, and importance of blood glucose control.  He is not willing to start insulin, or other injectable medications.  Only agreeable to oral medications at this time.  We will prescribe Rybelsus, concerned there may be a high cost associated with it.  Otherwise, would plan to start low-dose short acting glipizide with first meal the day.  Like to avoid longer acting preparations with his kidney  disease.  - Semaglutide (RYBELSUS) 3 MG tablet  Dispense: 30 tablet; Refill: 0   - Plan to titrate in 30 days  -Family to reach out if not covered by insurance  -Would plan for glipizide 5 mg in the a.m.  -ED follow-up precautions provided.  -Recommended checking sugars at home more frequently.  -Counseled on dietary changes.  -Continue metformin.    Change in voice  Noticed a change in his voice in July 2023.  Unclear if this is related to thyroid nodule as above.  May need to look into other etiologies for this.  Oropharynx appears clear.  -Continue to follow    Medication monitoring encounter  - Basic metabolic panel          No follow-ups on file.    Rafael White MD  [facility]    Marcelo Calzada is a 53 year old, presenting for the following health issues:  Other (Check up for lump in neck/throat that pt noticed 3-4 weeks ago. )    HPI     Thyroid nodule  Initially presented with midthoracic back pain, chest pain.  Story was concerning enough to undergo CTA chest abdomen to rule out aortic dissection.  That was negative.  CT did demonstrate a 1.1 cm eccentric nodule arising from the lower pole of the right hemithyroid.  He did develop a change to his voice the past couple of months, he is wondering if it is related to the thyroid nodule.  No dysphagia.      Chronic kidney disease   Most recent creatinine of 1.492 months prior.  Prior to that, measured 1.78.      Back pain   Initially seen in clinic on 8/14/2023 for 1 week of acute thoracic back pain.  No known trauma to the area.  Underwent CTA abdomen and chest to rule out aortic dissection, which was negative.  Did not show any pathology that would explain his symptoms.  Did show a couple chronic anterior rib fractures, he denies any known history of trauma to his anterior chest, or rib fractures.  Pain is improving at this point 3 out of 10 in severity.  Pain is constant, nothing makes it better or worse.    Diabetes  "mellitus type 2  Most recent A1c of 10.8.  Currently only on metformin.  Has been prescribed Lantus in the past, he is currently declining any injectable medications.  Not checking his blood sugars at home.  He is feeling well otherwise.  Does have some polyuria, no polydipsia.  No headaches or vision changes.  No dizziness or lightheadedness.            Objective    BP (!) 137/91   Pulse 78   Temp 97.6  F (36.4  C) (Oral)   Ht 1.626 m (5' 4\")   Wt 61.2 kg (135 lb)   SpO2 99%   BMI 23.17 kg/m    Body mass index is 23.17 kg/m .  Physical Exam   General Appearance: Comfortable.  No acute distress.  HEENT -mucous membranes are moist.  Oropharynx appears clear.  Neck -small nodule in the right thyroid.  Nontender.  Respiratory: Comfortable respiratory effort.  Clear to auscultation bilaterally.  No crackles or wheezing.  Cardiovascular: Regular rate and rhythm.  No murmurs rubs or gallops.  Back -no gross deformity.  Some tenderness to palpation in the thoracic paraspinal muscles.  No pain with forward flexion or extension.  Skin: No visible rashes or bruising   Neuro: Neutral affect.  Fluent speech.  Cranial nerves II through XII grossly intact                 "

## 2023-09-07 ENCOUNTER — TELEPHONE (OUTPATIENT)
Dept: FAMILY MEDICINE | Facility: CLINIC | Age: 53
End: 2023-09-07
Payer: COMMERCIAL

## 2023-09-07 ENCOUNTER — ANCILLARY PROCEDURE (OUTPATIENT)
Dept: ULTRASOUND IMAGING | Facility: CLINIC | Age: 53
End: 2023-09-07
Attending: FAMILY MEDICINE
Payer: COMMERCIAL

## 2023-09-07 DIAGNOSIS — E04.1 THYROID NODULE: ICD-10-CM

## 2023-09-07 LAB — TSH SERPL DL<=0.005 MIU/L-ACNC: 0.69 UIU/ML (ref 0.3–4.2)

## 2023-09-07 PROCEDURE — 76536 US EXAM OF HEAD AND NECK: CPT | Mod: TC | Performed by: RADIOLOGY

## 2023-09-07 NOTE — NURSING NOTE
Due to patient being non-English speaking/uses sign language, an  was used for this visit. Only for face-to-face interpretation by an external agency, date and length of interpretation can be found on the scanned worksheet.     name: Mert Garcia  Agency: [employer]  Language: [place]   Telephone number: .  Type of interpretation: Face-to-face, spoken

## 2023-09-07 NOTE — TELEPHONE ENCOUNTER
I call and lvm. Pt is needing U/S appt. Seema is off next week, so schedule on 09/21, unless if patient calls back and is okay to come in the afternoon today.    Please help schedule if patient calls back. Thank you

## 2023-09-19 ENCOUNTER — DOCUMENTATION ONLY (OUTPATIENT)
Dept: FAMILY MEDICINE | Facility: CLINIC | Age: 53
End: 2023-09-19
Payer: COMMERCIAL

## 2023-09-19 DIAGNOSIS — E04.1 THYROID NODULE: Primary | ICD-10-CM

## 2023-09-19 NOTE — PROGRESS NOTES
Thyroid nodule placed on problem list.    Rafael White MD PGY-3  [facility]

## 2023-10-02 ENCOUNTER — OFFICE VISIT (OUTPATIENT)
Dept: FAMILY MEDICINE | Facility: CLINIC | Age: 53
End: 2023-10-02
Payer: COMMERCIAL

## 2023-10-02 VITALS
SYSTOLIC BLOOD PRESSURE: 128 MMHG | HEIGHT: 63 IN | BODY MASS INDEX: 23.57 KG/M2 | DIASTOLIC BLOOD PRESSURE: 98 MMHG | WEIGHT: 133 LBS | OXYGEN SATURATION: 96 % | HEART RATE: 89 BPM

## 2023-10-02 DIAGNOSIS — N18.31 CHRONIC KIDNEY DISEASE, STAGE 3A (H): ICD-10-CM

## 2023-10-02 DIAGNOSIS — E11.9 TYPE 2 DIABETES MELLITUS WITHOUT COMPLICATION, WITHOUT LONG-TERM CURRENT USE OF INSULIN (H): Primary | ICD-10-CM

## 2023-10-02 LAB — HBA1C MFR BLD: 13.8 % (ref 0–5.6)

## 2023-10-02 PROCEDURE — 99214 OFFICE O/P EST MOD 30 MIN: CPT | Mod: GC

## 2023-10-02 PROCEDURE — 83036 HEMOGLOBIN GLYCOSYLATED A1C: CPT

## 2023-10-02 PROCEDURE — 36415 COLL VENOUS BLD VENIPUNCTURE: CPT

## 2023-10-02 RX ORDER — DAPAGLIFLOZIN 10 MG/1
10 TABLET, FILM COATED ORAL EVERY MORNING
Qty: 30 TABLET | Refills: 1 | Status: SHIPPED | OUTPATIENT
Start: 2023-10-02

## 2023-10-02 NOTE — PROGRESS NOTES
Assessment & Plan     Type 2 diabetes mellitus without complication, without long-term current use of insulin (H)  Hemoglobin A1c well above goal, measured 13.8 today, up from 10.8 prior.  Again long discussion today surrounding importance of strict glucose control.  Unable to  Rybelsus from the pharmacy, overall with anticipated cost, likely not a reasonable option.  Agreeable to restart SGLT2 inhibitor today, will start with Farxiga, has had side effects with Jardiance in the past.  Continue with metformin.  Again discussed likely need for injectable medication, continues to be hesitant with needle fears.  - Hemoglobin A1c  - Hemoglobin A1c  - dapagliflozin (FARXIGA) 10 MG TABS tablet  Dispense: 30 tablet; Refill: 1  - Continue metformin  - May benefit from repeat diabetes educator visit    Chronic kidney disease, stage 3a (H)  Likely in the setting of hypertension/diabetes.  Proteinuria present.  Currently on an ARB, would benefit from SGLT2 with both CKD and diabetes. Kidney function stable over the past year.  - dapagliflozin (FARXIGA) 10 MG TABS tablet  Dispense: 30 tablet; Refill: 1  - Discussed importance of blood glucose control  - Consider repeat renal ultrasound in the future.         No follow-ups on file.    Rafael White MD  [facility] Subjective Soua is a 53 year old, presenting for the following health issues:  Diabetes (Follow up, per pt ) and Medication Reconciliation (Unsure which medication)    HPI   Type 2 DM  - Metformin 1000mg nightly.   -Unable to  Rybelsus from the pharmacy.  -Discussed need for injectable medication, patient has a fear of needles, so is currently not willing to try any injectables.  - Not checking blood glucose at home   - Previously prescribed injectables, however never started   - Has seen diabetes educator in the past.         Objective    BP (!) 128/98 (BP Location: Left arm, Patient Position: Sitting, Cuff Size:  "Adult Regular)   Pulse 89   Ht 1.61 m (5' 3.39\")   Wt 60.3 kg (133 lb)   SpO2 96%   BMI 23.27 kg/m    Body mass index is 23.27 kg/m .  Physical Exam   Constitutional: Conversant, well developed. No acute distress  Eyes: Anicteric sclerae, no lid lag  Respiratory: Normal respiratory effort  Cardiovascular: No peripheral edema  Skin: No rash, lesion, or ulcer  Musculoskeletal: No digital cyanosis, normal gait  Psych: Intact judgment and insight. Alert and oriented x3. Cordial affect                 "

## 2023-10-09 ENCOUNTER — OFFICE VISIT (OUTPATIENT)
Dept: FAMILY MEDICINE | Facility: CLINIC | Age: 53
End: 2023-10-09
Payer: COMMERCIAL

## 2023-10-09 VITALS
WEIGHT: 132 LBS | TEMPERATURE: 97.9 F | OXYGEN SATURATION: 91 % | HEART RATE: 91 BPM | BODY MASS INDEX: 23.1 KG/M2 | SYSTOLIC BLOOD PRESSURE: 93 MMHG | DIASTOLIC BLOOD PRESSURE: 59 MMHG

## 2023-10-09 DIAGNOSIS — E11.22 TYPE 2 DIABETES MELLITUS WITH STAGE 3A CHRONIC KIDNEY DISEASE, WITHOUT LONG-TERM CURRENT USE OF INSULIN (H): ICD-10-CM

## 2023-10-09 DIAGNOSIS — Z11.4 SCREENING FOR HIV (HUMAN IMMUNODEFICIENCY VIRUS): ICD-10-CM

## 2023-10-09 DIAGNOSIS — M10.9 ACUTE GOUTY ARTHRITIS: Primary | ICD-10-CM

## 2023-10-09 DIAGNOSIS — Z12.11 SCREEN FOR COLON CANCER: ICD-10-CM

## 2023-10-09 DIAGNOSIS — N18.31 CHRONIC KIDNEY DISEASE, STAGE 3A (H): ICD-10-CM

## 2023-10-09 DIAGNOSIS — N18.31 TYPE 2 DIABETES MELLITUS WITH STAGE 3A CHRONIC KIDNEY DISEASE, WITHOUT LONG-TERM CURRENT USE OF INSULIN (H): ICD-10-CM

## 2023-10-09 DIAGNOSIS — Z11.59 NEED FOR HEPATITIS C SCREENING TEST: ICD-10-CM

## 2023-10-09 PROCEDURE — 99214 OFFICE O/P EST MOD 30 MIN: CPT | Performed by: FAMILY MEDICINE

## 2023-10-09 RX ORDER — PREDNISONE 20 MG/1
TABLET ORAL
Qty: 24 TABLET | Refills: 0 | Status: SHIPPED | OUTPATIENT
Start: 2023-10-09

## 2023-10-09 NOTE — PROGRESS NOTES
Faculty Supervision of Residents   I have examined this patient and the medical care has been evaluated and discussed with the resident. See resident note outlining our discussion.    Dorothea Cunha MD

## 2023-10-09 NOTE — PATIENT INSTRUCTIONS
Acute gout of left ankle:  Start prednisone 60 mg once daily today.  Take prednisone 60 mg once daily for 5 days, then reduce your dose to 40 mg for the next 3 days, then down to 20 mg for the following 3 days.  You can stop the prednisone early if your pain completely resolves.    Do not take Advil, ibuprofen, or other NSAIDs on top of the prednisone, as this can irritate your stomach and even cause stomach bleeding.    It is safe to take Tylenol 2 tablets up to 3 times daily in addition to the prednisone if needed for additional pain control.    I anticipate you will have significant improvement in your pain within 2 days of starting the prednisone medication.    Prednisone has potential fight side effects particularly concerning for you is raising your blood sugar.  Your blood sugars are already quite high, so it is necessary for you to start Lantus insulin to control your blood sugars.    Start Lantus insulin 10 units by injection every evening starting today.    Monitor your blood sugar at least twice daily.  Call the clinic if your blood glucoses are consistently over 300, as we will give you advice on how to increase your Lantus insulin dose to try to keep your blood sugars less than 300.    I anticipate your blood sugars will reduce after you finish the prednisone course, but taking Lantus insulin will be important for you long-term as your blood sugar average is much too high.    Return to clinic in 1 week for reevaluation of your gout as well as adjust your Lantus insulin dose.    Talk to your pharmacist about how to use pen needles, how to use a Lantus pen, and of more information about storing your insulin.    Call the clinic if your pain is not significantly improving within 2 days, blood sugars greater than 300, and a blood sugar reading less than 70, or other concerns

## 2023-10-09 NOTE — PROGRESS NOTES
HPI:   Zheng Llanos is a 53 year old  male who presents for:    Chief Complaint   Patient presents with    Musculoskeletal Problem     Wants med for gauze and cream     Severe left ankle pain.  Over the weekend he developed redness, swelling, and severe pain to the left ankle.  The pain is progressed over the last day where now he can hardly walk on the ankle.  Bearing any weight causes severe pain.  No injury.  He has had gout flares in the past and this feels like his past gout flares.  Prednisone has helped with his pain in the past.  In the past his flares have taken a full 7 days to improve with prednisone.  He tried ibuprofen at home with slight improvement.  Has not tried other treatments.    Type 2 diabetes: He was here last week and had an A1c of greater than 13.  He was prescribed Farxiga.  He has been using metformin at 1000 mg a day but not other hypoglycemics.  He has been reluctant to consider insulin or other injectable medication.  He points to his fear of needles as the reason.    Stage IIIa chronic kidney disease: He had a GFR in the 50s measured in September.  No change in urinary frequency and no hematuria.  Not using NSAIDs regularly.             PMHX:     Patient Active Problem List   Diagnosis    Proteinuria    Gastroesophageal reflux disease, esophagitis presence not specified    Type 2 diabetes mellitus, without long-term current use of insulin (H)    Benign essential hypertension    Chronic kidney disease, stage 3a (H)    Thyroid nodule       Current Outpatient Medications   Medication Sig Dispense Refill    insulin glargine (LANTUS PEN) 100 UNIT/ML pen Inject 10 Units Subcutaneous at bedtime 15 mL 0    insulin pen needle (32G X 4 MM) 32G X 4 MM miscellaneous Use 1 pen needles daily or as directed. 100 each 0    predniSONE (DELTASONE) 20 MG tablet 60mg once daily for 5 days, then 40mg daily for 3 days, then 20mg daily for 3 days then stop 24 tablet 0    STATIN NOT PRESCRIBED  (INTENTIONAL) Please choose reason not prescribed from choices below.      Alcohol Swabs (ALCOHOL PREP) 70 % PADS       allopurinol (ZYLOPRIM) 100 MG tablet Take 1 tablet (100 mg) by mouth daily 90 tablet 0    B-D U/F insulin pen needle       blood glucose (NO BRAND SPECIFIED) lancets standard Use to test blood sugar 1 times daily 100 each 3    blood glucose (NO BRAND SPECIFIED) test strip Use to test blood sugar 1 times daily= 100 strip 11    Blood Glucose Monitoring Suppl (ACCU-CHEK GUIDE ME) w/Device KIT       colchicine (COLCRYS) 0.6 MG tablet Take 1 tablet (0.6 mg) by mouth daily 90 tablet 1    cyclobenzaprine (FLEXERIL) 5 MG tablet Take 1 tablet (5 mg) by mouth 3 times daily as needed for muscle spasms 30 tablet 0    dapagliflozin (FARXIGA) 10 MG TABS tablet Take 1 tablet (10 mg) by mouth every morning 30 tablet 1    Global Inject Ease Lancets 30G MISC Inject 1 each Subcutaneous daily      Global Inject Ease Lancets 30G MISC Inject 1 each Subcutaneous daily      losartan (COZAAR) 25 MG tablet Take 1 tablet (25 mg) by mouth daily for 30 days 30 tablet 0    metFORMIN (GLUCOPHAGE XR) 500 MG 24 hr tablet Take 2 tablets (1,000 mg) by mouth daily (with dinner) 180 tablet 3       Social History     Tobacco Use    Smoking status: Never     Passive exposure: Never    Smokeless tobacco: Never   Substance Use Topics    Alcohol use: No    Drug use: No       Social History     Social History Narrative    Not on file       Allergies   Allergen Reactions    Seasonal Allergies        No results found for this or any previous visit (from the past 24 hour(s)).         Review of Systems:     General: No fevers  HEENT: No upper respiratory symptoms.  No recent visual changes.  Respiratory: No cough, no shortness of breath  Cardiovascular: No palpitations or chest pain episodes  GI: No nausea or vomiting diarrhea  Musculoskeletal: No other joints with redness, new pain.  He has tophi         Physical Exam:     Vitals:    10/09/23  1019   BP: 93/59   BP Location: Left arm   Pulse: 91   Temp: 97.9  F (36.6  C)   SpO2: 91%   Weight: 59.9 kg (132 lb)     Body mass index is 23.1 kg/m .    General: Alert,  in no acute distress  HEENT: Head is free of trauma.   Sclerae non-icteric. PERRL, Moist oral mucus membranes, no tonsilar hypertrophy or exudate.   Neck: No adenopathy  Resp: Clear to auscultation bilaterally  CV: Regular rate and rhythm  Abd: Soft, non-tender.  Ext: Warm.  Tender at the left ankle anterior to the lateral malleolus along the anterior ankle and syndesmosis.  Small amount of edema, and redness at the anterior ankle.  Range of motion is limited by pain.  Multiple gouty tophi of hands and feet  Psych: Mood appropriate         Assessment and Plan   1. Acute gouty arthritis  Acute gout of left ankle:  Start prednisone 60 mg once daily today.  Take prednisone 60 mg once daily for 5 days, then reduce your dose to 40 mg for the next 3 days, then down to 20 mg for the following 3 days.  You can stop the prednisone early if your pain completely resolves.    Do not take Advil, ibuprofen, or other NSAIDs on top of the prednisone, as this can irritate your stomach and even cause stomach bleeding.    It is safe to take Tylenol 2 tablets up to 3 times daily in addition to the prednisone if needed for additional pain control.    I anticipate you will have significant improvement in your pain within 2 days of starting the prednisone medication.    Prednisone has potential fight side effects particularly concerning for you is raising your blood sugar.  Your blood sugars are already quite high, so it is necessary for you to start Lantus insulin to control your blood sugars.    - predniSONE (DELTASONE) 20 MG tablet; 60mg once daily for 5 days, then 40mg daily for 3 days, then 20mg daily for 3 days then stop  Dispense: 24 tablet; Refill: 0    2. Type 2 diabetes mellitus, without long-term current use of insulin (H)  He has been resistant to  injections in the past, I demonstrated injections here in the clinic today, and he consented to using long-acting insulin.  I demonstrated needle use today and recommended that he talk to his pharmacist about how to use a Lantus pen, pen needles, and further demonstrate administration technique.    Start Lantus insulin 10 units by injection every evening starting today.    Monitor your blood sugar at least twice daily.  Call the clinic if your blood glucoses are consistently over 300, as we will give you advice on how to increase your Lantus insulin dose to try to keep your blood sugars less than 300.    I anticipate your blood sugars will reduce after you finish the prednisone course, but taking Lantus insulin will be important for you long-term as your blood sugar average is much too high.    Return to clinic in 1 week for reevaluation of your gout as well as adjust your Lantus insulin dose.    Talk to your pharmacist about how to use pen needles, how to use a Lantus pen, and of more information about storing your insulin.    Call the clinic if your pain is not significantly improving within 2 days, blood sugars greater than 300, and a blood sugar reading less than 70, or other concerns    - Adult Eye  Referral; Future  - STATIN NOT PRESCRIBED (INTENTIONAL); Please choose reason not prescribed from choices below.  - insulin glargine (LANTUS PEN) 100 UNIT/ML pen; Inject 10 Units Subcutaneous at bedtime  Dispense: 15 mL; Refill: 0  - insulin pen needle (32G X 4 MM) 32G X 4 MM miscellaneous; Use 1 pen needles daily or as directed.  Dispense: 100 each; Refill: 0    3. Screen for colon cancer    - Colonoscopy Screening  Referral; Future    4. Screening for HIV (human immunodeficiency virus)    - HIV Screening; Future    5. Need for hepatitis C screening test    - Hepatitis C Screen Reflex to HCV RNA Quant and Genotype; Future    6. Chronic kidney disease, stage 3a (H)    - Hemoglobin; Future  Follow  up: 1 week  Options for treatment and follow-up care were reviewed with the patient and/or guardian. Zheng Romi and/or guardian engaged in the decision making process and verbalized understanding of the options discussed and agreed with the final plan.    Brett Peterson MD  Faculty - [facility]

## 2023-10-09 NOTE — LETTER
RETURN TO WORK/SCHOOL FORM    10/9/2023    Re: Zheng Llanos  1970      To Whom It May Concern:     Zheng Llanos was seen in clinic today. He may be excuse and return to work without restrictions on 10/11/23. If you have any question please call clinic number above.               Brett Peterson MD  10/9/2023 11:19 AM

## 2023-10-11 ENCOUNTER — APPOINTMENT (OUTPATIENT)
Dept: INTERPRETER SERVICES | Facility: CLINIC | Age: 53
End: 2023-10-11
Payer: COMMERCIAL

## 2023-11-07 NOTE — PROGRESS NOTES
Your Child's Health  Five to Six-Year-Old Visit      Nilo Roberts  November 7, 2023    There were no vitals taken for this visit.  Weight:       YOUR CHILD'S 5 to 6 YEAR-OLD VISIT    School  Starting school is a major milestone for children and their family members. Good language and willingness or readiness to separate from parents easily are a couple of the most important skills indicating school readiness. Once a child is enrolled in school, parents need to keep in close contact with the teachers. Learning or developmental problems which had not been previously apparent may become evident in  or first grade. If your child had previously received some educational services or therapies in a  program, they may qualify for continued services in school. School systems are obligated to evaluate children if there are significant concerns about learning or developmental problems. If you need help accessing this type of evaluation, talk with your schools, your doctor, or you can call the Department of Public Instruction at (016) 128-7947 or visit their  website at http://[place].wi.gov.    Help ensure your child's success at school by making sure they are well rested (a regular bedtime routine is important in this regard). Also, make sure that they have eaten (or have an opportunity to eat at school) every morning. Children who are tired or hungry are not in the best state to learn well! Make sure they are not over scheduled with afterschool activities (sports, clubs, other social activities)-- children need some unstructured downtime every day. As your child learns to read, set aside time daily to read together--it helps them learn and is a great way to spend family time together.     Social Development  Five and six-year-old children will be spending more time with friends and peers and away from their families. It is important to know the friends and families that your child is spending    Assessment and Plan     HTN:  BP continues to be elevated, 131/91 here today on recheck. No issues when started on Losartan 2 weeks ago, and is taking when wakes up in AM which is how he prefers to take meds instead of at bedtime.   - BMP  - Losartan 50mg every day  - bp cuff  - F/U 2 months    DM2:  A1C 10.7 last month, and was only taking Metformin at that time, but highest amount. Will add another agent, and follow up in 2 months. Exercise has been poor due to pandemic and cold temps.  - Start Jardiance 10mg   - Continue Metformin 1000mg BID  - Encouraged even just 5 min of exercise like walking per day.  - F/U 2 months       Options for treatment and follow-up care were reviewed with the patient and/or guardian. Zheng Llanos and/or guardian engaged in the decision making process and verbalized understanding of the options discussed and agreed with the final plan.    Sascha Luis DO, MBA  [facility], PGY-2    Precepted patient with Dr. Odessa Burnett       HPI:   Zheng Llanos is a 50 year old male who presents to clinic today for   Chief Complaint   Patient presents with     Medication Reconciliation     needs attention     Recheck Medication     follow-up     BP Follow Up:  - Started on Losartan 25 mg 2 weeks ago after having body aches from other medicine (lisinopril 5mg)  - No problems taking medication now  - Takes when waking up   - Doesn't take BP at home  - No light headedness, dizziness, CP, SOB, GREGORIO, LE edema    DM2:  - Taking metformin daily   - No issues with the above med  - Does eat vegetables, and meats mostly. Trying to cut out juices and sweets.    A Thinkglue  was used for this visit    Denies F, CP, SOB, changes in vision/hearing/GI//diet, abdominal pain, numbness/tingling, or any other concerns.         PMHX:   Active Problems List  Patient Active Problem List   Diagnosis     Proteinuria     Gastroesophageal reflux  "disease, esophagitis presence not specified     Type 2 diabetes mellitus, without long-term current use of insulin (H)     Benign essential hypertension       Current Medications  Current Outpatient Medications   Medication Sig Dispense Refill     blood glucose (NO BRAND SPECIFIED) test strip Use to test blood sugar 1-2 times daily or as directed. 100 strip 11     blood glucose monitoring XX meter device kit Use to test blood sugar 1-2 times daily or as directed. 1 kit 1     blood glucose XX lancets standard Use to test blood sugar 1-2 times daily or as directed. 100 each 11     Contour Next EZ (CONTOUR NEXT EZ W/DEVICE KIT) w/Device KIT        empagliflozin (JARDIANCE) 10 MG TABS tablet Take 1 tablet (10 mg) by mouth daily 30 tablet 3     losartan (COZAAR) 50 MG tablet Take 1 tablet (50 mg) by mouth daily 30 tablet 3     metFORMIN (GLUCOPHAGE) 1000 MG tablet Take 1 tablet (1,000 mg) by mouth 2 times daily (with meals) 60 tablet 11     metFORMIN 1000 MG PO tablet Take 1 tablet (1,000 mg) by mouth 2 times daily (with meals) 60 tablet 1       Social History  Social History     Tobacco Use     Smoking status: Never Smoker     Smokeless tobacco: Never Used   Substance Use Topics     Alcohol use: No     Drug use: No     History   Drug Use No       Family History  Family History   Problem Relation Age of Onset     Heart Disease No family hx of      Diabetes No family hx of      Cancer No family hx of        Allergies  Allergies   Allergen Reactions     No Known Allergies             Physical Exam:     Vitals:    03/11/21 1526   BP: (!) 131/91   BP Location: Right arm   Cuff Size: Adult Regular   Pulse: 80   Resp: 16   Temp: 97.7  F (36.5  C)   TempSrc: Oral   SpO2: 97%   Weight: 64.6 kg (142 lb 6.4 oz)   Height: 1.605 m (5' 3.19\")     Body mass index is 25.07 kg/m .    GENERAL APPEARANCE: alert, appears stated age, no acute distress  HEENT: Eyes grossly normal to inspection, nares normal, and mouth and throat without " time with.     Peers have a lot of influence on each other, and your child may be interacting with friends who do not have to follow the same rules that you have set for your child. Some rules may change as they get older, but being very clear and very consistent continues to be critical component of effective parenting. Children will frequently push the limits at this age to see what they can get away with--so it is important to regularly remind your child of appropriate rules and expectations that you have for them.     Help your child feel good about themselves by giving them praise for their accomplishments, doing things together, and listening to them without interrupting. Give them opportunities to gradually be more independent and responsible.    Continue to set a good example for them - be responsible in your own obligations, mean what you say, model appropriate behavior when you yourself are angry or upset, and show respect for others by being on time. When your child is angry or frustrated, talk to them about why they are feeling that way, what their options are and how to resolve conflicts appropriately. Physical aggression - hitting, biting, kicking, throwing things- should not be tolerated at this age; teach your children healthier ways to respond to upsetting situations and blow off steam.    Households with working parents and children school are busy! Try to devote mealtimes, bedtimes, and even driving time, to talk with your children-- keep phones and other electronic media turned off and focus on talking to each other.     Remind your children that they can tell you anything. It is especially important that they know to report you if they feel they are being teased or bullied. They should also be taught to report bullying that they witness to you or to a teacher. Any concerns about bullying should be addressed-talk to your teachers, administrators or guidance counselors at the school to help with  erythema, ulcers, or lesions  RESP: lungs clear to auscultation - no rales, rhonchi, or wheezes  CV: regular rate and rhythm, no murmur, click, rub, or gallop  ABDOMEN: soft, nontender   MSK: extremities normal, no gross deformities noted, no lower extremity edema  SKIN: no suspicious lesions or rashes   NEURO: Normal strength and tone, sensory exam grossly normal, mentation appears intact and speech normal  PSYCH: mood and affect normal/bright          this issue. Good online resources regarding bullying are [employer] and the American Academy of Pediatrics \"HealthyChildren.org\" website (search for \"Bullying\").     Dental  Your child should be brushing at least twice daily for 2 minutes at a time with a pea-sized amount of regular (fluoridated) toothpaste. Children this age can also be taught to floss their teeth, but they still need a parent’s help to make sure all their back teeth are brushed well. Look for a dentist if you do not already have one. Limiting candy, other sweets, juice and sticky/chewy foods is good for their dental health.     Nutrition  Your child will be making more of their own choices about what to eat, so keep plenty of nutritious foods in your home for meal and snack times. Make sure your child eats something healthy for breakfast every morning; this is proven to positively impact school performance and learning. Your child needs ~3 servings of good sources of calcium everyday; this can include lowfat (or skim) milk, yogurt, low fat cheese or foods which have been fortified with calcium. Children this age should get 600 IU of vitamin D daily which (along with appropriate calcium intake) ensures good bone health. Most people cannot meet their vitamin D needs with their usual diet, so a multivitamin with iron supplement continues to be appropriate for this age. (A pure vitamin D supplement with 400 or 600 IU is also okay.)    Overweight and obesity are very serious problems; teaching your growing child to make healthy choices is important, as is continuing to avoid unhealthy choices like greasy fast food, bagged snacks, sodas and sweet drinks, lots of juice, candies and sweets. Make unhealthy choices an occasional treat only; don’t keep them in your home, because your child will find them!    Physical Activity and Screen Time   A child who enjoys being physically active when they are young will be more likely to stay active as they grow  older. Set a goal of 60 minutes of physical activity every day--it can be all at once or broken up into shorter segments. Try to make it a family activity as much as possible.     Time watching television, playing on the computer or using any other form of electronic media is NOT physical activity. As your child learns to read and their fine motor skills improve, they are going to become very skilled at using the computer and Internet (and they are going to like it!). Setting limits and supervising media use is very important. Set a time limit for media use each day (and enforce it). Consider setting up child-specific browsers and creating a “Favorites” toolbar so your child can only get to approved websites. For suggestions on developing healthy media habits, do an internet search for “healthychildren media use plan” for recommendations from the American Academy of Pediatrics.  Also, don't allow snacking in front of the TV set-- that is another unhealthy habit that is easier to prevent than change!    And parents need to be a role model--if you are constantly on your phone in situations where you could be talking with or interacting with your child, you are teaching them that the phone takes priority over your interaction with them.        Safety  Car:  Until a child weighs at least 40 pounds, they are safest in a rear or forward-facing car seat with a 5-point harness. If your 5-point harness seat has a higher weight limit than 40 pounds, keep your child in it-- they are safest in these seats until they outgrow the 's recommended size limits. (There is not a specific height limit for most of these seats, but children are safe as long as the top of their ears are below the top/ back edge of the seat and their shoulders are below the highest shoulder strap slots.) When they do outgrow the 5-point harness seat limits, they should ride in a booster seat in the backseat. High-backed booster seats should be  used if there are low seat backs or no head rests in your car; backless boosters can be used if your car has high seat backs and head rests.    Street Safety: Teach your child how to be safe when standing outside waiting for a bus or walking to school. Children this age should always be supervised when crossing streets.     Biking: Children (and their parents) should wear properly fitted helmets when biking. Children this age are not safe riding in the street.    Water & Sun Safety: Swimming lessons are a good idea if your child does not know how to swim yet. Even if they can swim, constant supervision by an adult who know how to swim is a must. Remember to use sunscreen with an SPF of 15 or higher when outside and reapply after time in the water.  Your child should avoid prolonged time in the sun between 11 AM and 3 PM and wear hats, sunglasses and sun protection clothing.     Personal Safety: A parent’s safety is just as important as a child’s safety. Violence is common in many people’s lives. If you do not feel safe in your home or if a partner has ever hit, kicked, shoved or physically hurt you or your child, it is important for you to get help. Talk to your doctors or a . In [place], resources include [facility] (951-218-9817) and the [facility] (24 hour hotline is 295- 678-8387); the National Domestic Violence Hotline is 2-563-010-IVDP (0341).    Review with your child that certain body parts (the parts usually covered by a bathing suit) and behaviors are private. For safety purposes, make sure your child knows that they should never keep secrets from parents, and they should always report to you if any adult shows any interest in their private parts (or if an adult discussed or shared their own private parts with a child). Tell them they should talk to you if any adult is doing or saying anything that makes them uncomfortable, especially if an adult is asking them  to keep a secret.    Fires & Burns: Children this age are fascinated by fires and they can be very compulsive--so watch them very carefully around fires,  grills, and stoves. Make sure matches and lighters are safely stored out of reach and continue to keep all hot items out of reach. Have a family fire safety plan, including regular smoke detector (and carbon monoxide detector) battery checks, working fire extinguishers, and escape routes. It is important that children this age know what door they should go out of if the smoke alarm goes off, where to meet family members outside and the importance of not going back into a burning building.     Firearms: Children this age are not capable of understanding how dangerous firearms are and evidence shows a child is safest in a home where no firearms are stored. If there any hunters or firearm owners in your home or anywhere your child is visiting, make sure all weapons are safely stored: unloaded, securely locked and ammunition stored separately.   Smoking: Continue to protect your child from cigarette smoke; secondhand smoke increases the risk of heart and lung disease in your child. Vapors from e-cigarettes may also be harmful, so don’t use those around your child either. If you smoke and are ready to consider quitting, talk to your doctor. Nicotine replacement products can be very helpful in breaking this tough addiction. 1-800-QUIT-NOW is a national help line that can help you find resources; other resources can be found at cdc.gov.       MEDICATION FOR FEVER OR PAIN:   Acetaminophen liquid (e.g., Tylenol or Tempra) may be given every four hours as needed for pain or fever.  Acetaminophen liquid is less concentrated than the infant dropper bottle type.  Be sure to check which product CONCENTRATION you are using.    OLD Concentration INFANT Tylenol/Acetaminophen  Drops (80 MG/0.8 mL)    Child’s Weight: Dose:  36 - 47 pounds:   240 mg (3 droppers)  48 - 59 pounds:    320 mg (4 droppers)    NEW Concentration INFANT Tylenol/Acetaminophen  Drops (160 MG/5 mL)    Child’s Weight: Dose:  36 - 47 pounds:   240 mg (7.5 mL (1 1/2 Teaspoons))  48 - 59 pounds:   320 mg (10.0 mL (2 Teaspoons))    CHILDREN’S Tylenol/Acetaminophen  (160 MG/5 mL)    Child’s Weight: Dose:  36 - 47 pounds:   240 240 mg (7.5 mL (1 1/2 Teaspoons))  48 - 59 pounds:   320 mg (10.0 mL (2 Teaspoons))    CHILDREN’S Tylenol/Acetaminophen MELTAWAYS ( 80 MG tablets)    Child’s Weight:  Dose:  36 - 47 pounds:    240 mg (3 meltaway tablets)  48 - 59 pounds:    320 mg (4 meltaway tablets)    CHILDREN'S Ibuprofen liquid (e.g., Advil or Motrin) may be given every six hours as needed for pain or fever.    Child’s Weight:  Dose:  36 - 47 pounds:    150 MG (1 1/2 Teaspoons)  48 - 59 pounds  200 mg (2 Teaspoons)     Most Recent Immunizations   Administered Date(s) Administered   • DTaP(INFANRIX) 04/23/2018   • DTaP/Hep B/IPV 06/20/2017   • DTaP/IPV 11/23/2020   • HIB, Unspecified Formulation 04/10/2017   • Hep A, ped/adol, 2 dose 01/08/2019   • Hep B, adolescent or pediatric 2016   • Hib (PRP-OMP) 04/23/2018   • Influenza, injectable, quadrivalent, preservative free, pediatric 01/08/2019   • Influenza, quadrivalent, preserve-free 11/22/2021   • MMR 01/08/2018   • Measles Mumps Rubella Varicella 11/23/2020   • Pneumococcal Conjugate 13 Valent Vacc (Prevnar 13) 01/08/2018   • Rotavirus - monovalent 04/10/2017   • Varicella 01/08/2018   Pended Date(s) Pended   • Influenza, quadrivalent, preserve-free 11/07/2023       If Brynlee develops any of the following reactions within 72 hours after an immunization, notify your pediatrician by calling the pediatric phone nurse:  1.  A temperature of 105 degrees or above.  2.  More than 3 hours of continuous crying.  3.  A shrill, high-pitched cry.  4.  A pale, limp spell.  5.  A seizure or fainting spell.  In this case, you should call 911 or go immediately to the emergency  room.      NEXT VISIT:  6 YEARS OF AGE    Thank you for entrusting your care to [facility].    Also, check out “Children’s Health” on the [facility] Blog for updates on timely topics regarding children’s health!

## 2023-11-13 ENCOUNTER — OFFICE VISIT (OUTPATIENT)
Dept: FAMILY MEDICINE | Facility: CLINIC | Age: 53
End: 2023-11-13
Payer: COMMERCIAL

## 2023-11-13 VITALS
DIASTOLIC BLOOD PRESSURE: 89 MMHG | SYSTOLIC BLOOD PRESSURE: 144 MMHG | RESPIRATION RATE: 18 BRPM | OXYGEN SATURATION: 98 % | WEIGHT: 136.75 LBS | HEIGHT: 63 IN | HEART RATE: 65 BPM | BODY MASS INDEX: 24.23 KG/M2

## 2023-11-13 DIAGNOSIS — N18.31 CHRONIC KIDNEY DISEASE, STAGE 3A (H): ICD-10-CM

## 2023-11-13 DIAGNOSIS — E11.22 TYPE 2 DIABETES MELLITUS WITH STAGE 3A CHRONIC KIDNEY DISEASE, WITHOUT LONG-TERM CURRENT USE OF INSULIN (H): ICD-10-CM

## 2023-11-13 DIAGNOSIS — E11.43 TYPE 2 DIABETES MELLITUS WITH DIABETIC AUTONOMIC NEUROPATHY, WITHOUT LONG-TERM CURRENT USE OF INSULIN (H): ICD-10-CM

## 2023-11-13 DIAGNOSIS — N18.31 TYPE 2 DIABETES MELLITUS WITH STAGE 3A CHRONIC KIDNEY DISEASE, WITHOUT LONG-TERM CURRENT USE OF INSULIN (H): ICD-10-CM

## 2023-11-13 DIAGNOSIS — Z00.00 HEALTHCARE MAINTENANCE: ICD-10-CM

## 2023-11-13 DIAGNOSIS — E11.9 TYPE 2 DIABETES MELLITUS WITHOUT COMPLICATION, WITHOUT LONG-TERM CURRENT USE OF INSULIN (H): Primary | ICD-10-CM

## 2023-11-13 PROCEDURE — 90677 PCV20 VACCINE IM: CPT | Performed by: MASSAGE THERAPIST

## 2023-11-13 PROCEDURE — 99214 OFFICE O/P EST MOD 30 MIN: CPT | Mod: 25 | Performed by: MASSAGE THERAPIST

## 2023-11-13 PROCEDURE — 90472 IMMUNIZATION ADMIN EACH ADD: CPT | Performed by: MASSAGE THERAPIST

## 2023-11-13 PROCEDURE — 90746 HEPB VACCINE 3 DOSE ADULT IM: CPT | Performed by: MASSAGE THERAPIST

## 2023-11-13 PROCEDURE — 90471 IMMUNIZATION ADMIN: CPT | Performed by: MASSAGE THERAPIST

## 2023-11-13 NOTE — PATIENT INSTRUCTIONS
-Take 10 units of insulin every night.  One insulin pen should last many days   -Check your blood sugar once a day before eating anything, write down the numbers and bring with you to your next visit      Writer notified patient of the urine culture result.  Discussed new prescription.  Discussed that we will call with remaining tests once finalized.  All questions answered and Patient verbalized understanding. Preferred pharmacy entered.  Please enter prescription.

## 2023-11-13 NOTE — PROGRESS NOTES
Preceptor Attestation:  Patient's case reviewed and discussed with the resident, Michael Mclaughlin MD, and I personally evaluated the patient. I agree with written assessment and plan of care.    Supervising Physician:  Cecile Yin MD   [facility]

## 2023-11-13 NOTE — PROGRESS NOTES
Assessment & Plan     Type 2 diabetes mellitus without complication, without long-term current use of insulin (H)  Type 2 diabetes mellitus with stage 3a chronic kidney disease, without long-term current use of insulin (H)  Neuropathy   Has not been checking sugars at home.  Discussed the importance of this and he is agreeable to check fasting once a day. Consider CGM at next visit. He does have some numbness in left great toe, otherwise DM foot exam normal.  Reviewed importance of foot hygiene.     There was some confusion about how to use the insulin pens, he thought they were single dose and so only took 4 days of insulin. Reviewed how to use pens and that each one contains approximately a months worth of medicine.  He will have his wife go with him to the pharmacy and review with pharmacist how to take.   - blood glucose (NO BRAND SPECIFIED) lancets standard; Use to test blood sugar 1 times daily  - blood glucose (NO BRAND SPECIFIED) test strip; Use to test blood sugar 1 times daily=  - ROUTINE FOOT CARE BY A PHYSICIAN OF A DIABETIC PATIENT WITH DIABETIC SENSORY  - insulin glargine (LANTUS PEN) 100 UNIT/ML pen; Inject 10 Units Subcutaneous at bedtime    Chronic kidney disease, stage 3a (H)  Due for Hgb check.   - Hemoglobin; Future      Healthcare maintenance  Due for screening tests.   - HIV Screening; Future  - Hepatitis C Screen Reflex to HCV RNA Quant and Genotype; Future      Return in about 4 weeks (around 12/11/2023) for DM .    Michael Mclaughlin MD  [facility]    Marcelo Calzada is a 53 year old, presenting for the following health issues:  Follow Up (DM )    HPI     Diabetes Follow up   Last seen on 10/9/23 by Dr. Peterson, planned to: start 10 unit lantus in the evening, check sugars BID. Since that time,     Patient glucose self monitoring: does not check. Does have supplies      Current med regimen according to patient: Thought insulin pens were single use, only took for 4  "days (wife gives him the injections)   Is taking- Metformin, Farxiga  Problems taking medications regularly? thought only 1 dose per pen, so has not been taking   Normal day of eating: Rice, vegetables, boiled meat. Will try to cut back on rice and increase vegetable     Complications:    Retinopathy: no  Changes in vision   Nephropathy: no edema, changes in urine output    Neuropathy:no numbness/tingling in hands and feet    Lab Results   Component Value Date    A1C 13.8 10/02/2023    A1C 10.8 06/28/2023    A1C 11.8 02/15/2022    A1C 9.3 07/12/2021    A1C 10.7 02/17/2021    A1C 9.9 09/29/2020    A1C 13.5 02/20/2020               Objective    BP (!) 144/89   Pulse 65   Resp 18   Ht 1.6 m (5' 3\")   Wt 62 kg (136 lb 12 oz)   SpO2 98%   BMI 24.22 kg/m    Body mass index is 24.22 kg/m .  Physical Exam   EXAM  General: Alert, well-appearing, no acute distress  Head: Nontraumatic   Eyes: EOM intact   Oropharynx: moist oral mucus membranes  Pulm: CTA BL, no tachypnea, speaking in full sentences  CV: RRR, no murmur  Ext: Warm, well perfused,  no LE edema  Skin: No rash on limited skin exam  Neuro: moves all 4 extremities  Psych: Mood appropriate to visit content, full affect, rational thought content and process    Diabetic foot exam: normal DP and PT pulses, no trophic changes or ulcerative lesions, normal monofilament exam, except for plntar surface of left great toe          ----- Service Performed and Documented by Resident or Fellow ------              "

## 2023-11-14 ENCOUNTER — LAB (OUTPATIENT)
Dept: LAB | Facility: CLINIC | Age: 53
End: 2023-11-14
Payer: COMMERCIAL

## 2023-11-14 DIAGNOSIS — E11.9 TYPE 2 DIABETES MELLITUS WITHOUT COMPLICATION, WITHOUT LONG-TERM CURRENT USE OF INSULIN (H): ICD-10-CM

## 2023-11-14 DIAGNOSIS — Z00.00 HEALTHCARE MAINTENANCE: ICD-10-CM

## 2023-11-14 DIAGNOSIS — N18.31 CHRONIC KIDNEY DISEASE, STAGE 3A (H): ICD-10-CM

## 2023-11-14 LAB — HGB BLD-MCNC: 15.4 G/DL (ref 13.3–17.7)

## 2023-11-14 PROCEDURE — 82565 ASSAY OF CREATININE: CPT

## 2023-11-14 PROCEDURE — 87389 HIV-1 AG W/HIV-1&-2 AB AG IA: CPT

## 2023-11-14 PROCEDURE — 36415 COLL VENOUS BLD VENIPUNCTURE: CPT

## 2023-11-14 PROCEDURE — 86803 HEPATITIS C AB TEST: CPT

## 2023-11-14 PROCEDURE — 85018 HEMOGLOBIN: CPT

## 2023-11-15 LAB
CREAT SERPL-MCNC: 1.39 MG/DL (ref 0.67–1.17)
EGFRCR SERPLBLD CKD-EPI 2021: 61 ML/MIN/1.73M2
HCV AB SERPL QL IA: NONREACTIVE
HIV 1+2 AB+HIV1 P24 AG SERPL QL IA: NONREACTIVE

## 2023-12-18 ENCOUNTER — APPOINTMENT (OUTPATIENT)
Dept: INTERPRETER SERVICES | Facility: CLINIC | Age: 53
End: 2023-12-18
Payer: COMMERCIAL

## 2023-12-18 DIAGNOSIS — Z12.11 SCREEN FOR COLON CANCER: Primary | ICD-10-CM

## 2024-11-01 ENCOUNTER — TRANSFERRED RECORDS (OUTPATIENT)
Dept: MULTI SPECIALTY CLINIC | Facility: CLINIC | Age: 54
End: 2024-11-01

## 2024-11-01 LAB
RETINOPATHY: NORMAL
RETINOPATHY: NORMAL

## 2025-01-30 ENCOUNTER — TELEPHONE (OUTPATIENT)
Dept: FAMILY MEDICINE | Facility: CLINIC | Age: 55
End: 2025-01-30

## 2025-01-30 ENCOUNTER — OFFICE VISIT (OUTPATIENT)
Dept: FAMILY MEDICINE | Facility: CLINIC | Age: 55
End: 2025-01-30
Payer: COMMERCIAL

## 2025-01-30 ENCOUNTER — ANCILLARY PROCEDURE (OUTPATIENT)
Dept: GENERAL RADIOLOGY | Facility: CLINIC | Age: 55
End: 2025-01-30
Attending: FAMILY MEDICINE
Payer: COMMERCIAL

## 2025-01-30 VITALS
HEIGHT: 64 IN | TEMPERATURE: 98.3 F | SYSTOLIC BLOOD PRESSURE: 116 MMHG | HEART RATE: 94 BPM | BODY MASS INDEX: 21.19 KG/M2 | RESPIRATION RATE: 20 BRPM | DIASTOLIC BLOOD PRESSURE: 75 MMHG | OXYGEN SATURATION: 93 % | WEIGHT: 124.12 LBS

## 2025-01-30 DIAGNOSIS — M1A.0720 IDIOPATHIC CHRONIC GOUT OF LEFT ANKLE WITHOUT TOPHUS: ICD-10-CM

## 2025-01-30 DIAGNOSIS — E11.22 TYPE 2 DIABETES MELLITUS WITH STAGE 3 CHRONIC KIDNEY DISEASE, WITHOUT LONG-TERM CURRENT USE OF INSULIN, UNSPECIFIED WHETHER STAGE 3A OR 3B CKD (H): Primary | ICD-10-CM

## 2025-01-30 DIAGNOSIS — N18.31 CHRONIC KIDNEY DISEASE, STAGE 3A (H): ICD-10-CM

## 2025-01-30 DIAGNOSIS — Z12.11 SCREEN FOR COLON CANCER: ICD-10-CM

## 2025-01-30 DIAGNOSIS — K92.0 HEMATEMESIS WITH NAUSEA: ICD-10-CM

## 2025-01-30 DIAGNOSIS — N18.30 TYPE 2 DIABETES MELLITUS WITH STAGE 3 CHRONIC KIDNEY DISEASE, WITHOUT LONG-TERM CURRENT USE OF INSULIN, UNSPECIFIED WHETHER STAGE 3A OR 3B CKD (H): Primary | ICD-10-CM

## 2025-01-30 DIAGNOSIS — N18.31 TYPE 2 DIABETES MELLITUS WITH STAGE 3A CHRONIC KIDNEY DISEASE, WITHOUT LONG-TERM CURRENT USE OF INSULIN (H): ICD-10-CM

## 2025-01-30 DIAGNOSIS — R05.1 ACUTE COUGH: ICD-10-CM

## 2025-01-30 DIAGNOSIS — E11.22 TYPE 2 DIABETES MELLITUS WITH STAGE 3A CHRONIC KIDNEY DISEASE, WITHOUT LONG-TERM CURRENT USE OF INSULIN (H): ICD-10-CM

## 2025-01-30 DIAGNOSIS — E11.9 TYPE 2 DIABETES MELLITUS WITHOUT COMPLICATION, WITHOUT LONG-TERM CURRENT USE OF INSULIN (H): ICD-10-CM

## 2025-01-30 DIAGNOSIS — E11.9 TYPE 2 DIABETES MELLITUS, WITHOUT LONG-TERM CURRENT USE OF INSULIN (H): Primary | ICD-10-CM

## 2025-01-30 LAB
BASOPHILS # BLD AUTO: 0.1 10E3/UL (ref 0–0.2)
BASOPHILS NFR BLD AUTO: 0 %
CREAT UR-MCNC: 104 MG/DL
EOSINOPHIL # BLD AUTO: 0.2 10E3/UL (ref 0–0.7)
EOSINOPHIL NFR BLD AUTO: 1 %
ERYTHROCYTE [DISTWIDTH] IN BLOOD BY AUTOMATED COUNT: 11.8 % (ref 10–15)
EST. AVERAGE GLUCOSE BLD GHB EST-MCNC: 341 MG/DL
FLUAV RNA SPEC QL NAA+PROBE: POSITIVE
FLUBV RNA RESP QL NAA+PROBE: NEGATIVE
HBA1C MFR BLD: 13.5 % (ref 0–5.6)
HCT VFR BLD AUTO: 46 % (ref 40–53)
HGB BLD-MCNC: 15.6 G/DL (ref 13.3–17.7)
IMM GRANULOCYTES # BLD: 0 10E3/UL
IMM GRANULOCYTES NFR BLD: 0 %
LYMPHOCYTES # BLD AUTO: 3.9 10E3/UL (ref 0.8–5.3)
LYMPHOCYTES NFR BLD AUTO: 30 %
MCH RBC QN AUTO: 29.5 PG (ref 26.5–33)
MCHC RBC AUTO-ENTMCNC: 33.9 G/DL (ref 31.5–36.5)
MCV RBC AUTO: 87 FL (ref 78–100)
MICROALBUMIN UR-MCNC: 280 MG/L
MICROALBUMIN/CREAT UR: 269.23 MG/G CR (ref 0–17)
MONOCYTES # BLD AUTO: 1.7 10E3/UL (ref 0–1.3)
MONOCYTES NFR BLD AUTO: 13 %
NEUTROPHILS # BLD AUTO: 7.4 10E3/UL (ref 1.6–8.3)
NEUTROPHILS NFR BLD AUTO: 56 %
PLATELET # BLD AUTO: 182 10E3/UL (ref 150–450)
RBC # BLD AUTO: 5.29 10E6/UL (ref 4.4–5.9)
RSV RNA SPEC NAA+PROBE: NEGATIVE
SARS-COV-2 RNA RESP QL NAA+PROBE: NEGATIVE
WBC # BLD AUTO: 13.2 10E3/UL (ref 4–11)

## 2025-01-30 PROCEDURE — 71046 X-RAY EXAM CHEST 2 VIEWS: CPT | Mod: TC | Performed by: RADIOLOGY

## 2025-01-30 RX ORDER — ONDANSETRON 4 MG/1
4 TABLET, ORALLY DISINTEGRATING ORAL EVERY 8 HOURS PRN
Qty: 20 TABLET | Refills: 0 | Status: SHIPPED | OUTPATIENT
Start: 2025-01-30

## 2025-01-30 RX ORDER — HYDROCHLOROTHIAZIDE 12.5 MG/1
CAPSULE ORAL
Qty: 6 EACH | Refills: 3 | Status: SHIPPED | OUTPATIENT
Start: 2025-01-30

## 2025-01-30 RX ORDER — KETOROLAC TROMETHAMINE 30 MG/ML
1 INJECTION, SOLUTION INTRAMUSCULAR; INTRAVENOUS ONCE
Qty: 1 EACH | Refills: 0 | Status: SHIPPED | OUTPATIENT
Start: 2025-01-30 | End: 2025-01-30

## 2025-01-30 RX ORDER — DAPAGLIFLOZIN 10 MG/1
10 TABLET, FILM COATED ORAL EVERY MORNING
Qty: 30 TABLET | Refills: 1 | Status: SHIPPED | OUTPATIENT
Start: 2025-01-30

## 2025-01-30 RX ORDER — BENZONATATE 100 MG/1
100 CAPSULE ORAL 3 TIMES DAILY PRN
Qty: 30 CAPSULE | Refills: 0 | Status: SHIPPED | OUTPATIENT
Start: 2025-01-30

## 2025-01-30 RX ORDER — METFORMIN HYDROCHLORIDE 500 MG/1
1000 TABLET, EXTENDED RELEASE ORAL
Qty: 180 TABLET | Refills: 3 | Status: SHIPPED | OUTPATIENT
Start: 2025-01-30

## 2025-01-30 RX ORDER — LANCETS 30 GAUGE
1 EACH MISCELLANEOUS DAILY
Qty: 100 EACH | Refills: 11 | Status: SHIPPED | OUTPATIENT
Start: 2025-01-30

## 2025-01-30 NOTE — LETTER
[facility] [address]   [place] [address]  [phone]  [phone]      1/30/2025    Re: Zheng Llanos      TO WHOM IT MAY CONCERN:    Zheng Llanos  was seen on 1/30/25.  Please excuse him from 1/27/25 until 2/3/25 due to illness.    Cordially    Soham Yeboah MD

## 2025-01-30 NOTE — PROGRESS NOTES
Preceptor Attestation:   Patient seen, evaluated and discussed with the resident. I personally reviewed the imaging and agree with the interpretation documented by the resident. I have verified the content of the note, which accurately reflects my assessment of the patient and the plan of care.  Supervising Physician:Swathi Roberson MD  [facility]

## 2025-01-30 NOTE — TELEPHONE ENCOUNTER
Retail Pharmacy Prior Authorization Team  Phone: [phone]    PRIOR AUTHORIZATION DENIED    Medication: FREESTYLE MAGDA 3 READER BRAULIO  Insurance Company: [employer] ([facility]) - Phone [phone] Fax [phone]  Denial Date: 1/30/2025  Denial Reason(s):         Appeal Information:           Patient Notified: NO- Unfortunately, we cannot call the patient with denials because we do not know what next steps the MD will take nor can we give medical advice, please notify the patient of what they are to expect for the continuation of their therapy from the provider.

## 2025-01-30 NOTE — PATIENT INSTRUCTIONS
Start taking insulin (lantus) 10 units daily  Please check your blood sugars once you get your continuous glucose monitor  Will get CXR today  Will test for covid/flu/RSV today  Placed referral for EGD (upper endoscopy) with GI urgent referral to assess your bloody vomiting - they will call you in the next few days w/ to set up procedure. Please call clinic if you do not hear back from them in the next week  Sent zofran 4 mg tablet dissolving for nausea, can take up to 3 times daily. Recommend taking before meals  Sent tessalon pearls for cough, can take up to 3 times per day  Will call you with any abnormal or concerning lab values.  Please schedule a follow up with myself or your primary doctor in ~2 weeks for diabetes follow up

## 2025-01-30 NOTE — PROGRESS NOTES
Assessment & Plan     (E11.9) Type 2 diabetes mellitus, without long-term current use of insulin (H)  (primary encounter diagnosis)    Comment: Poorly controlled, has not followed up since 10/2023 at that time A1C was 13.8. Ran out of medications and has not been taking. Will refill insulin and resume lantus at 10 units daily, metformin, farxiga. Plan to have close T2DM with myself or PCP to further address. Interested in CGM, ordered today. Patient would greatly benefit from this due to poor compliance in the past.   Plan: HEMOGLOBIN A1C, Lipid panel reflex to direct         LDL Non-fasting, Global Inject Ease Lancets 30G        MISC, Continuous Glucose  (FREESTYLE         MAGDA 3 READER) BRAULIO, Continuous Glucose Sensor        (FREESTYLE MAGDA 3 PLUS SENSOR) MISC  Plan: metFORMIN (GLUCOPHAGE XR) 500 MG 24 hr tablet,         dapagliflozin (FARXIGA) 10 MG TABS tablet  Plan: insulin glargine (LANTUS PEN) 100 UNIT/ML pen,         insulin pen needle (32G X 4 MM) 32G X 4 MM         miscellaneous            (N18.31) Chronic kidney disease, stage 3a (H)  Comment: repeat BMP today in setting of poorly controlled T2DM, continue farxiga (restarting today)  Plan: Albumin Random Urine Quantitative with Creat         Ratio, BASIC METABOLIC PANEL, Hemoglobin,         dapagliflozin (FARXIGA) 10 MG TABS tablet            (Z12.11) Screen for colon cancer  Comment: declined colonoscopy referral today in setting of more urgent evaluation required with upper endoscopy as detailed below  Plan: defer to future visits    (M1A.0920) Idiopathic chronic gout of left ankle without tophus  Comment: stable, recheck yearly uric acid today. Has colchicine, allopurinol available.  Plan: Uric acid            (R05.1) Acute cough  Comment: 1 week of cough w/questionable hemoptysis vs. Hematemsis, subjective fevers, productive cough w/SOB and chest pressure/congestion. Reports brown/black sputum. Will obtain TB testing with no prior testing  on file, no known exposures. Will send with medicine to help with cough PRN, obtain CXR to r/out pneumonia or likely bacterial etiology, in setting of recent weight loss also concerned for malignancy.  Plan: Influenza A/B, RSV and SARS-CoV2 PCR         (COVID-19), benzonatate (TESSALON) 100 MG         capsule, Quantiferon TB Gold Plus    Reviewed CXR with Dr. Roberson, looks relatively clear with no obvious effusion or evidence of consolidation. Appears slightly hyperinflated BL, would favor post-viral changes as most likely etiology. No indication to start abx at this             (K92.0) Hematemesis with nausea  Comment: x1 week when other illness process started. No previous history of hematemesis. Denies melena, constipation, endorses nonbloody diarrhea today. Will treat N/V due to patient having difficulty keeping food and water down, will obtain urgent referral to GI for upper endoscopy.   Plan: CBC with platelets differential, XR CHEST 2 VW,        ondansetron (ZOFRAN ODT) 4 MG ODT tab,         Quantiferon TB Gold Plus, Adult GI          Referral - Procedure Only            No follow-ups on file.    Subjective   Zheng is a 54 year old, presenting for the following health issues:  RECHECK (Fever and cough as well as patient has not been able to eat, when attempted to eat he throws up.//Needs work note. ) and Refill Request    Zheng Llanos is a 55 y/o M presenting to clinic today due to concerns of cough and fever.    Per chart review, patient has very poorly controlled T2DM. Last A1C 13.8 10/2023. Last visit with Dr. Mclaughlin was recommended to consider CGM as had not been checking BG at home, was started on insulin lantus 10 units daily, farxiga (prev. Intolerance to jardiance) in setting of CKD3, metformin.    Patient has significant care gaps I.e. due for A1C, lipid, microalbumin, uric acid, bmp, hgb;  Covid, flu, hep b, zoster vaccinations. Needs eye exam, CRC screening.       Today, reports cough  "x1 week w/mucous, fever, vomiting (unable to keep meals down, small snacks ok), requests work note and refill request.  Afebrile VSS.   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     T2DM  -Last A1C 13.8  -would like to wait to get labs until feeling better  -Not checking blood sugars at home  -Interested in CGM - will order today  -Not taking any of his diabetes medications currently (no insulin, farxiga, metformin)    Refills:  -diabetes medication           Objective    /75 (BP Location: Right arm, Patient Position: Sitting, Cuff Size: Adult Regular)   Pulse 94   Temp 98.3  F (36.8  C) (Oral)   Resp 20   Ht 1.62 m (5' 3.78\")   Wt 56.3 kg (124 lb 1.9 oz)   SpO2 93%   BMI 21.45 kg/m    Body mass index is 21.45 kg/m .  Physical Exam   GENERAL: alert and no distress  NECK: no adenopathy, no asymmetry, masses, or scars  RESP: good air movement BL, some diminished lung sounds in the R>L base. Mild end expiratory wheezing, no crackles, rales, or rhonchi.   CV: regular rate and rhythm, normal S1 S2, no S3 or S4, no murmur, click or rub, no peripheral edema  ABDOMEN: soft, nontender, no hepatosplenomegaly, no masses and bowel sounds normal  ABDOMEN: soft, nontender, without hepatosplenomegaly or masses, bowel sounds normal, and epigastric tenderness to palpation  MS: no gross musculoskeletal defects noted, no edema          Signed Electronically by: Soham Yeboah MD  {Email " feedback regarding this note to [email or web address]   :[id number]}    Soham Yeboah MD  PGY-2  [facility]  [facility]  January 30, 2025

## 2025-01-30 NOTE — TELEPHONE ENCOUNTER
Retail Pharmacy Prior Authorization Team  Phone: [phone]    PRIOR AUTHORIZATION DENIED    Medication: FREESTYLE MAGDA 3 PLUS SENSOR MISC  Insurance Company: [employer] ([facility]) - Phone [phone] Fax [phone]  Denial Date: 1/30/2025  Denial Reason(s):         Appeal Information:         Patient Notified: NO- Unfortunately, we cannot call the patient with denials because we do not know what next steps the MD will take nor can we give medical advice, please notify the patient of what they are to expect for the continuation of their therapy from the provider.

## 2025-02-01 LAB
GAMMA INTERFERON BACKGROUND BLD IA-ACNC: 0.1 IU/ML
M TB IFN-G BLD-IMP: NEGATIVE
M TB IFN-G CD4+ BCKGRND COR BLD-ACNC: 9.9 IU/ML
MITOGEN IGNF BCKGRD COR BLD-ACNC: 0.05 IU/ML
MITOGEN IGNF BCKGRD COR BLD-ACNC: 0.21 IU/ML

## 2025-02-03 RX ORDER — ACYCLOVIR 400 MG/1
1 TABLET ORAL ONCE
Qty: 1 EACH | Refills: 0 | Status: SHIPPED | OUTPATIENT
Start: 2025-02-03 | End: 2025-02-03

## 2025-02-03 RX ORDER — ACYCLOVIR 400 MG/1
1 TABLET ORAL
Qty: 9 EACH | Refills: 5 | Status: SHIPPED | OUTPATIENT
Start: 2025-02-03

## 2025-02-03 NOTE — TELEPHONE ENCOUNTER
Sent Rx for dexcom G7, if not covered will have to try sending POC glucometer.    Soham Yeboah MD  PGY-2  [facility]  [facility]  February 3, 2025

## 2025-02-11 ENCOUNTER — OFFICE VISIT (OUTPATIENT)
Dept: FAMILY MEDICINE | Facility: CLINIC | Age: 55
End: 2025-02-11
Payer: COMMERCIAL

## 2025-02-11 VITALS
HEART RATE: 74 BPM | SYSTOLIC BLOOD PRESSURE: 109 MMHG | RESPIRATION RATE: 20 BRPM | BODY MASS INDEX: 21.34 KG/M2 | HEIGHT: 64 IN | DIASTOLIC BLOOD PRESSURE: 71 MMHG | WEIGHT: 125 LBS | TEMPERATURE: 97.9 F | OXYGEN SATURATION: 98 %

## 2025-02-11 DIAGNOSIS — K92.0 HEMATEMESIS WITH NAUSEA: ICD-10-CM

## 2025-02-11 DIAGNOSIS — E11.22 TYPE 2 DIABETES MELLITUS WITH STAGE 3A CHRONIC KIDNEY DISEASE, WITHOUT LONG-TERM CURRENT USE OF INSULIN (H): ICD-10-CM

## 2025-02-11 DIAGNOSIS — Z12.11 SCREEN FOR COLON CANCER: Primary | ICD-10-CM

## 2025-02-11 DIAGNOSIS — N18.31 CHRONIC KIDNEY DISEASE, STAGE 3A (H): ICD-10-CM

## 2025-02-11 DIAGNOSIS — E78.5 HYPERLIPIDEMIA LDL GOAL <70: ICD-10-CM

## 2025-02-11 DIAGNOSIS — N18.31 TYPE 2 DIABETES MELLITUS WITH STAGE 3A CHRONIC KIDNEY DISEASE, WITHOUT LONG-TERM CURRENT USE OF INSULIN (H): ICD-10-CM

## 2025-02-11 PROCEDURE — 91320 SARSCV2 VAC 30MCG TRS-SUC IM: CPT

## 2025-02-11 PROCEDURE — 90480 ADMN SARSCOV2 VAC 1/ONLY CMP: CPT

## 2025-02-11 PROCEDURE — 99214 OFFICE O/P EST MOD 30 MIN: CPT | Mod: 25

## 2025-02-11 RX ORDER — ATORVASTATIN CALCIUM 40 MG/1
40 TABLET, FILM COATED ORAL DAILY
Qty: 90 TABLET | Refills: 1 | Status: SHIPPED | OUTPATIENT
Start: 2025-02-11

## 2025-02-11 NOTE — PROGRESS NOTES
Preceptor Attestation:   Patient seen, evaluated and discussed with the resident. I have verified the content of the note, which accurately reflects my assessment of the patient and the plan of care.  Supervising Physician:Em Winkler MD  [facility]

## 2025-02-11 NOTE — PROGRESS NOTES
Assessment & Plan     (Z12.11) Screen for colon cancer  (primary encounter diagnosis)  Comment: deferred, would like to pursue EGD first as outlined below.   Plan: deferred to future visit.    (K92.0) Hematemesis with nausea  Comment: Has recently subsided thankfully as he has recovered from influenza, however still think it is pertinent he gets EGD to evaluate gastritis and hematemesis. Patient was told needed new referral for scheduling when he attempted to call - will place new referral per request.  Plan: Adult GI  Referral - Procedure Only            (E78.5) Hyperlipidemia LDL goal <70  Comment: Checked lipids at last visit 1/30, ; ASCVD risk 9.8%; recommended to start high intensity statin. Patient agreeable, will send today and start for primary prevention of ASCVD per risk/benefit discussion with patient and son.   Plan: atorvastatin (LIPITOR) 40 MG tablet            (E11.22,  N18.31) Type 2 diabetes mellitus with stage 3a chronic kidney disease, without long-term current use of insulin (H)  Chronic kidney disease, stage 3a (H) [N18.31]   Comment: Was unable to  insulin; very strong hesitancy towards needles. Sent new CGM (dexcom G7) that should be covered by insurance. We compromised on starting weekly injection w/GLP-1 given his severe T2DM with concurrent kidney disease (CKD stage 3a). Will have follow up in ~3 weeks for dose titration.  Plan: semaglutide (OZEMPIC) 2 MG/3ML pen  Continue metformin BID, farxiga        No follow-ups on file.    Subjective   Zheng is a 54 year old, presenting for the following health issues:  Diabetes (Recheck ) and Imm/Inj (Ok with covid vaccine )    Zheng Llanos is a 53 y/o M with pertinent hx of T2DM w/CKD (checked A1C at last visit, 13.8, had not been taking medicines. Refilled medicines as starting point (lantus 10 units daily, metformin, farxiga), also ordered CGM.    His primary issue at our last visit 1/30 was influenza (did not send tamiflu  "since he had already been symptomatic for 1 week), he also endorsed hematemesis to me so I had placed referral for GI for urgent upper endoscopy. TB was negative, CXR nonrevealing for focal consolidation or pulmonary nodules/lesions. Reports recovered today from flu.    Today:  T2DM  CKD  A1C 1/30/25 13.5, glucose >300  -insulin? - Not okay with using insulin, wants to avoid needles if possible. did not  from pharmacy.  -was able to  metformin, farxiga, has been taking.   -CGM - sent on dexcom G7 due to insurance coverage issues when trying to send freestyle gil 2, has not picked up yet.   -metformin and farxiga.   -Reports would be amenable to GLP-1  -professional cgm?  -Has eye doctor, will schedule appointment.     Hematemesis:  GI?  -clear today, has not had recurrence since our last visit.  -GI reached out to schedule; stated needs new referral.     HLD:  Lipids checked,  (goal <70 T2DM) - not on statin; ASCVD 10 year risk 9.8%, high intensity statin recommended.   -Agreeable to start statin today.    Due for covid would like to get today; declines flu, Hep B, zoster, CRC screen today.                Objective    /71   Pulse 74   Temp 97.9  F (36.6  C) (Oral)   Resp 20   Ht 1.613 m (5' 3.5\")   Wt 56.7 kg (125 lb)   SpO2 98%   BMI 21.80 kg/m    Body mass index is 21.8 kg/m .  Physical Exam   GENERAL: alert and no distress  NECK: no adenopathy, no asymmetry, masses, or scars  RESP: lungs clear to auscultation - no rales, rhonchi or wheezes  CV: regular rate and rhythm, normal S1 S2, no S3 or S4, no murmur, click or rub, no peripheral edema  ABDOMEN: soft, nontender, no hepatosplenomegaly, no masses and bowel sounds normal  MS: no gross musculoskeletal defects noted, no edema  Diabetic foot exam: normal DP and PT pulses, no trophic changes or ulcerative lesions, and normal sensory exam        Signed Electronically by: Soham Yeboah MD    "

## 2025-02-11 NOTE — PATIENT INSTRUCTIONS
Please  your dexcom G7 (continuous glucose monitor device) from your pharmacy - please let us know if unable to  due to insurance costs  Sent ozempic to your pharmacy - will start at 0.25 mg and slowly increase your dose. This is a weekly injection that is typically done in the abdomen or thigh  Sent new medication for cholesterol - atorvastatin. Please take 40 mg daily, please let us know if having side effects  Sent new referral for EGD - please call if you do not hear back to schedule  Follow up with me in ~1 month for diabetes follow up after you have picked up your glucose monitor and please bring with you to clinic as well as your medications. Please continue taking metformin and farxiga daily for diabetes and kidney function.

## 2025-03-24 ENCOUNTER — TELEPHONE (OUTPATIENT)
Dept: GASTROENTEROLOGY | Facility: CLINIC | Age: 55
End: 2025-03-24
Payer: COMMERCIAL

## 2025-03-24 ENCOUNTER — HOSPITAL ENCOUNTER (OUTPATIENT)
Facility: CLINIC | Age: 55
End: 2025-03-24
Attending: INTERNAL MEDICINE | Admitting: INTERNAL MEDICINE
Payer: COMMERCIAL

## 2025-03-24 NOTE — TELEPHONE ENCOUNTER
"Endoscopy Scheduling Screen    Have you had any respiratory illness or flu-like symptoms in the last 10 days?  No    What is your communication preference for Instructions and/or Bowel Prep?   MyChart    What insurance is in the chart?  Other:      Ordering/Referring Provider:   ASIM HARRIS        (If ordering provider performs procedure, schedule with ordering provider unless otherwise instructed. )    BMI: Estimated body mass index is 21.8 kg/m  as calculated from the following:    Height as of 2/11/25: 1.613 m (5' 3.5\").    Weight as of 2/11/25: 56.7 kg (125 lb).     Sedation Ordered  moderate sedation.   If patient BMI > 50 do not schedule in [facility].    If patient BMI > 45 do not schedule at [facility].    Are you taking methadone or Suboxone?  NO, No RN review required.    Have you been diagnosed and are being treated for severe PTSD or severe anxiety?  NO, No RN review required.    Are you taking any prescription medications for pain 3 or more times per week?   NO, No RN review required.    Do you have a history of malignant hyperthermia?  No    (Females) Are you currently pregnant?   No     Have you been diagnosed or told you have pulmonary hypertension?   No    Do you have an LVAD?  No    Have you been told you have moderate to severe sleep apnea?  No.    Have you been told you have COPD, asthma, or any other lung disease?  No    Has your doctor ordered any cardiac tests like echo, angiogram, stress test, ablation, or EKG, that you have not completed yet?  No    Do you  have a history of any heart conditions?  No     Have you ever had or are you waiting for an organ transplant?  No. Continue scheduling, no site restrictions.    Have you had a stroke or transient ischemic attack (TIA aka \"mini stroke\") in the last 2 years?   No.    Have you been diagnosed with or been told you have cirrhosis of the liver?   No.    Are you currently on dialysis?   No    Do you need assistance transferring?   No    BMI: " "Estimated body mass index is 21.8 kg/m  as calculated from the following:    Height as of 2/11/25: 1.613 m (5' 3.5\").    Weight as of 2/11/25: 56.7 kg (125 lb).     Is patients BMI > 40 and scheduling location [facility]?  No    Do you take an injectable or oral medication for weight loss or diabetes (excluding insulin)?  Yes, hold time can be up to 7 days. Please consult with you prescribing provider to discuss endoscopy recommendations. (Please schedule at least 7 days out.) OZEMPIC    Do you take the medication Naltrexone?  No    Do you take blood thinners?  No       Prep   Are you currently on dialysis or do you have chronic kidney disease?  Yes ([facility] Prep)    Do you have a diagnosis of diabetes?  Yes (G[facility]ytely Prep)    Do you have a diagnosis of cystic fibrosis (CF)?  No    On a regular basis do you go 3 -5 days between bowel movements?  No    BMI > 40?  No    Preferred Pharmacy:    [facility] - [place], [place] - [address]  [address]  [place] [address]  Phone: [phone] Fax: [phone]    Final Scheduling Details     Procedure scheduled  Upper endoscopy (EGD)    Surgeon:  SAVANNAH     Date of procedure:  04/02/2025     Pre-OP / PAC:   No - Not required for this site.    Location  [facility] - Per order.    Sedation   Moderate Sedation - Per order.      Patient Reminders:   You will receive a call from a Nurse to review instructions and health history.  This assessment must be completed prior to your procedure.  Failure to complete the Nurse assessment may result in the procedure being cancelled.      On the day of your procedure, please designate an adult(s) who can drive you home stay with you for the next 24 hours. The medicines used in the exam will make you sleepy. You will not be able to drive.      You cannot take public transportation, ride share services, or non-medical taxi service without a responsible caregiver.  Medical transport services are allowed with the " requirement that a responsible caregiver will receive you at your destination.  We require that drivers and caregivers are confirmed prior to your procedure.

## 2025-03-25 ENCOUNTER — APPOINTMENT (OUTPATIENT)
Dept: INTERPRETER SERVICES | Facility: CLINIC | Age: 55
End: 2025-03-25
Payer: COMMERCIAL

## 2025-04-02 ENCOUNTER — TELEPHONE (OUTPATIENT)
Dept: GASTROENTEROLOGY | Facility: CLINIC | Age: 55
End: 2025-04-02
Payer: COMMERCIAL

## 2025-04-02 NOTE — TELEPHONE ENCOUNTER
Caller: Writer to Klever - Patient's Son    Reason for Reschedule/Cancellation (please be detailed, any staff messages or encounters to note?):   Patient no showed EGD procedure    Did you cancel or rescheduled an E[facility] procedure? No.    Is screening questionnaire older than 3 months from the reschedule date.   If Yes, please complete screening questionnaire. No    Prior to reschedule please review:  Ordering Provider: Em Winkler  Sedation Determined: Moderate  Does patient have any ASC Exclusions, please identify?: Yes, high Hgb A1C - per RN Assessment on order    Notes on Cancelled Procedure:  Procedure: Upper Endoscopy [EGD]   Date: 4/2/25  Location: [facility]; [address] [place] [address], [address] Floor, [place] [address]   Surgeon: Michael    Rescheduled: No, Spoke with Patient's Son Klever and left scheduling number for patient to call and reschedule